# Patient Record
Sex: FEMALE | Race: WHITE | NOT HISPANIC OR LATINO | Employment: OTHER | URBAN - METROPOLITAN AREA
[De-identification: names, ages, dates, MRNs, and addresses within clinical notes are randomized per-mention and may not be internally consistent; named-entity substitution may affect disease eponyms.]

---

## 2018-06-26 ENCOUNTER — TRANSCRIBE ORDERS (OUTPATIENT)
Dept: ADMINISTRATIVE | Facility: HOSPITAL | Age: 53
End: 2018-06-26

## 2018-06-26 DIAGNOSIS — Z12.39 SCREENING BREAST EXAMINATION: Primary | ICD-10-CM

## 2018-07-09 ENCOUNTER — HOSPITAL ENCOUNTER (OUTPATIENT)
Dept: MAMMOGRAPHY | Facility: HOSPITAL | Age: 53
Discharge: HOME | End: 2018-07-09
Attending: OBSTETRICS & GYNECOLOGY | Admitting: OBSTETRICS & GYNECOLOGY

## 2018-07-09 DIAGNOSIS — Z12.39 SCREENING BREAST EXAMINATION: ICD-10-CM

## 2018-07-09 PROCEDURE — 77067 SCR MAMMO BI INCL CAD: CPT

## 2018-07-10 DIAGNOSIS — R92.8 ABNORMAL MAMMOGRAM: Primary | ICD-10-CM

## 2018-07-18 ENCOUNTER — TELEPHONE (OUTPATIENT)
Dept: OBSTETRICS AND GYNECOLOGY | Facility: CLINIC | Age: 53
End: 2018-07-18

## 2018-07-18 ENCOUNTER — HOSPITAL ENCOUNTER (OUTPATIENT)
Dept: MAMMOGRAPHY | Facility: HOSPITAL | Age: 53
Discharge: HOME | End: 2018-07-18
Attending: OBSTETRICS & GYNECOLOGY | Admitting: OBSTETRICS & GYNECOLOGY

## 2018-07-18 ENCOUNTER — HOSPITAL ENCOUNTER (OUTPATIENT)
Dept: ULTRASOUND IMAGING | Facility: HOSPITAL | Age: 53
Discharge: HOME | End: 2018-07-18
Attending: OBSTETRICS & GYNECOLOGY

## 2018-07-18 DIAGNOSIS — R92.8 ABNORMAL MAMMOGRAM: ICD-10-CM

## 2018-07-18 PROCEDURE — 77065 DX MAMMO INCL CAD UNI: CPT

## 2018-07-18 PROCEDURE — 76642 ULTRASOUND BREAST LIMITED: CPT

## 2018-07-18 NOTE — TELEPHONE ENCOUNTER
Gregory called and said that they recommend a bx on the right breast.  They said they saw a lesion.  Said report should be in imaging.

## 2018-07-19 DIAGNOSIS — R92.8 ABNORMAL MAMMOGRAM OF RIGHT BREAST: Primary | ICD-10-CM

## 2018-07-25 ENCOUNTER — HOSPITAL ENCOUNTER (OUTPATIENT)
Dept: MAMMOGRAPHY | Facility: HOSPITAL | Age: 53
Discharge: HOME | End: 2018-07-25
Attending: OBSTETRICS & GYNECOLOGY | Admitting: OBSTETRICS & GYNECOLOGY

## 2018-07-25 VITALS
OXYGEN SATURATION: 98 % | RESPIRATION RATE: 18 BRPM | HEART RATE: 75 BPM | BODY MASS INDEX: 30.53 KG/M2 | TEMPERATURE: 98.7 F | WEIGHT: 190 LBS | SYSTOLIC BLOOD PRESSURE: 141 MMHG | HEIGHT: 66 IN | DIASTOLIC BLOOD PRESSURE: 86 MMHG

## 2018-07-25 DIAGNOSIS — R92.8 ABNORMAL MAMMOGRAM OF RIGHT BREAST: ICD-10-CM

## 2018-07-25 PROCEDURE — 88305 TISSUE EXAM BY PATHOLOGIST: CPT | Performed by: OBSTETRICS & GYNECOLOGY

## 2018-07-25 RX ORDER — LIDOCAINE HYDROCHLORIDE 10 MG/ML
3 INJECTION, SOLUTION INFILTRATION; PERINEURAL ONCE
Status: COMPLETED | OUTPATIENT
Start: 2018-07-25 | End: 2018-07-25

## 2018-07-25 RX ORDER — LIDOCAINE HYDROCHLORIDE AND EPINEPHRINE 10; 10 MG/ML; UG/ML
20 INJECTION, SOLUTION INFILTRATION; PERINEURAL ONCE
Status: COMPLETED | OUTPATIENT
Start: 2018-07-25 | End: 2018-07-25

## 2018-07-25 RX ADMIN — LIDOCAINE HYDROCHLORIDE 3 ML: 10 INJECTION, SOLUTION INFILTRATION; PERINEURAL at 12:50

## 2018-07-25 RX ADMIN — LIDOCAINE HYDROCHLORIDE AND EPINEPHRINE 20 ML: 10; 10 INJECTION, SOLUTION INFILTRATION; PERINEURAL at 12:51

## 2018-07-25 NOTE — NURSING NOTE
Biopsy site to right medial breast clear with Dermabond dry and intact. No firmness or swelling noted at or around biopsy site. Denies pain. Ice pack with protective covering applied to biopsy site. Discharge instructions discussed with understanding voiced by patient. Copies provided to patient. No distress noted. To home via private vehicle.

## 2018-07-26 ENCOUNTER — TELEPHONE (OUTPATIENT)
Dept: INTERVENTIONAL RADIOLOGY/VASCULAR | Facility: HOSPITAL | Age: 53
End: 2018-07-26

## 2018-07-27 LAB
CYTO UR: NORMAL
LAB AP CASE REPORT: NORMAL
LAB AP CLINICAL INFORMATION: NORMAL
LAB AP INTRADEPARTMENTAL CONSULT: NORMAL
PATH REPORT.FINAL DX SPEC: NORMAL
PATH REPORT.GROSS SPEC: NORMAL

## 2018-07-30 ENCOUNTER — TELEPHONE (OUTPATIENT)
Dept: SURGERY | Facility: CLINIC | Age: 53
End: 2018-07-30

## 2018-07-30 DIAGNOSIS — N60.99 ATYPICAL HYPERPLASIA OF BREAST: Primary | ICD-10-CM

## 2018-07-30 DIAGNOSIS — R89.7 ABNORMAL BREAST BIOPSY: Primary | ICD-10-CM

## 2018-07-30 NOTE — PROGRESS NOTES
I spoke to pt by phone and explained that she has 2 lesions that need further evaluation by a breast surgeon. I would like her referred to Dr. Roma Hernandez and she is agreeable. Please arrange referral. Orders placed in EPIC

## 2018-07-30 NOTE — TELEPHONE ENCOUNTER
Message sent to Katy in Mammo Dept.   Needing to ask Dr. Sky to give us a measurement on the calcs seen on mammo 7-9-18    lml

## 2018-08-06 ENCOUNTER — HOSPITAL ENCOUNTER (OUTPATIENT)
Dept: MRI IMAGING | Facility: HOSPITAL | Age: 53
Discharge: HOME | End: 2018-08-06
Attending: SURGERY | Admitting: SURGERY

## 2018-08-06 DIAGNOSIS — N60.99 ATYPICAL HYPERPLASIA OF BREAST: ICD-10-CM

## 2018-08-06 LAB — CREAT BLDA-MCNC: 0.6 MG/DL (ref 0.6–1.3)

## 2018-08-06 PROCEDURE — 82565 ASSAY OF CREATININE: CPT

## 2018-08-06 PROCEDURE — 0159T HC MRI BREAST COMPUTER ANALYSIS: CPT

## 2018-08-06 PROCEDURE — A9577 INJ MULTIHANCE: HCPCS | Performed by: SURGERY

## 2018-08-06 PROCEDURE — 0 GADOBENATE DIMEGLUMINE 529 MG/ML SOLUTION: Performed by: SURGERY

## 2018-08-06 PROCEDURE — C8908 MRI W/O FOL W/CONT, BREAST,: HCPCS

## 2018-08-06 RX ADMIN — GADOBENATE DIMEGLUMINE 17 ML: 529 INJECTION, SOLUTION INTRAVENOUS at 17:00

## 2018-08-16 ENCOUNTER — PREP FOR SURGERY (OUTPATIENT)
Dept: OTHER | Facility: HOSPITAL | Age: 53
End: 2018-08-16

## 2018-08-16 ENCOUNTER — OFFICE VISIT (OUTPATIENT)
Dept: SURGERY | Facility: CLINIC | Age: 53
End: 2018-08-16

## 2018-08-16 VITALS
HEIGHT: 66 IN | DIASTOLIC BLOOD PRESSURE: 97 MMHG | OXYGEN SATURATION: 97 % | SYSTOLIC BLOOD PRESSURE: 149 MMHG | BODY MASS INDEX: 31.57 KG/M2 | WEIGHT: 196.4 LBS | HEART RATE: 84 BPM

## 2018-08-16 DIAGNOSIS — Z80.3 FH: BREAST CANCER: ICD-10-CM

## 2018-08-16 DIAGNOSIS — D05.11 DUCTAL CARCINOMA IN SITU (DCIS) OF RIGHT BREAST: ICD-10-CM

## 2018-08-16 DIAGNOSIS — N60.99 ATYPICAL HYPERPLASIA OF BREAST: Primary | ICD-10-CM

## 2018-08-16 DIAGNOSIS — R92.8 ABNORMAL MRI, BREAST: ICD-10-CM

## 2018-08-16 DIAGNOSIS — D05.11 BREAST NEOPLASM, TIS (DCIS), RIGHT: Primary | ICD-10-CM

## 2018-08-16 DIAGNOSIS — IMO0001: ICD-10-CM

## 2018-08-16 DIAGNOSIS — R92.1 BREAST CALCIFICATIONS ON MAMMOGRAM: ICD-10-CM

## 2018-08-16 PROCEDURE — 99244 OFF/OP CNSLTJ NEW/EST MOD 40: CPT | Performed by: SURGERY

## 2018-08-16 RX ORDER — SODIUM CHLORIDE, SODIUM LACTATE, POTASSIUM CHLORIDE, CALCIUM CHLORIDE 600; 310; 30; 20 MG/100ML; MG/100ML; MG/100ML; MG/100ML
100 INJECTION, SOLUTION INTRAVENOUS CONTINUOUS
Status: CANCELLED | OUTPATIENT
Start: 2018-08-16

## 2018-08-16 RX ORDER — CEFAZOLIN SODIUM 2 G/100ML
2 INJECTION, SOLUTION INTRAVENOUS ONCE
Status: CANCELLED | OUTPATIENT
Start: 2018-08-16 | End: 2018-08-16

## 2018-08-16 RX ORDER — DIAZEPAM 5 MG/1
10 TABLET ORAL ONCE
Status: CANCELLED | OUTPATIENT
Start: 2018-08-16 | End: 2018-08-16

## 2018-08-17 ENCOUNTER — TELEPHONE (OUTPATIENT)
Dept: SURGERY | Facility: CLINIC | Age: 53
End: 2018-08-17

## 2018-08-17 ENCOUNTER — TRANSCRIBE ORDERS (OUTPATIENT)
Dept: SURGERY | Facility: CLINIC | Age: 53
End: 2018-08-17

## 2018-08-17 DIAGNOSIS — D05.11 DUCTAL CARCINOMA IN SITU OF RIGHT BREAST: Primary | ICD-10-CM

## 2018-08-17 NOTE — TELEPHONE ENCOUNTER
Scheduled Consult with Dr Rey  8-30-18 arrive 1:15  [address] [facility]  [address]  [place] [address]  [phone]    Scheduled Surgery Main OR 9-19-18    Right Breast Needle Localized Lumpectomy, Oncoplastic  Closure, Possible Nipple Loss.  Dr Rey          Arrive        9:30  Nl            11:30  Surgery     1:00    PAT 9-10-18  @ 1:00    Return to see Dr KING  10 -2-18 Arrive 12:15    LM for pt to call:    marcella    Pt called and v/u with appointments  marcella

## 2018-08-27 ENCOUNTER — TELEPHONE (OUTPATIENT)
Dept: SURGERY | Facility: CLINIC | Age: 53
End: 2018-08-27

## 2018-08-28 ENCOUNTER — TELEPHONE (OUTPATIENT)
Dept: SURGERY | Facility: CLINIC | Age: 53
End: 2018-08-28

## 2018-08-28 NOTE — TELEPHONE ENCOUNTER
I called to let patient know genetic test are negative.   Confirmed with patient and shared met genetic counseling information.   lynn

## 2018-09-10 ENCOUNTER — APPOINTMENT (OUTPATIENT)
Dept: PREADMISSION TESTING | Facility: HOSPITAL | Age: 53
End: 2018-09-10

## 2018-09-10 VITALS
TEMPERATURE: 98.2 F | HEART RATE: 72 BPM | RESPIRATION RATE: 20 BRPM | BODY MASS INDEX: 31.5 KG/M2 | HEIGHT: 66 IN | OXYGEN SATURATION: 97 % | SYSTOLIC BLOOD PRESSURE: 135 MMHG | DIASTOLIC BLOOD PRESSURE: 82 MMHG | WEIGHT: 196 LBS

## 2018-09-10 LAB
DEPRECATED RDW RBC AUTO: 40 FL (ref 37–54)
ERYTHROCYTE [DISTWIDTH] IN BLOOD BY AUTOMATED COUNT: 12.7 % (ref 11.7–13)
HCT VFR BLD AUTO: 43.2 % (ref 35.6–45.5)
HGB BLD-MCNC: 13.9 G/DL (ref 11.9–15.5)
MCH RBC QN AUTO: 27.9 PG (ref 26.9–32)
MCHC RBC AUTO-ENTMCNC: 32.2 G/DL (ref 32.4–36.3)
MCV RBC AUTO: 86.7 FL (ref 80.5–98.2)
PLATELET # BLD AUTO: 237 10*3/MM3 (ref 140–500)
PMV BLD AUTO: 11.4 FL (ref 6–12)
RBC # BLD AUTO: 4.98 10*6/MM3 (ref 3.9–5.2)
WBC NRBC COR # BLD: 7.55 10*3/MM3 (ref 4.5–10.7)

## 2018-09-10 PROCEDURE — 36415 COLL VENOUS BLD VENIPUNCTURE: CPT

## 2018-09-10 PROCEDURE — 85027 COMPLETE CBC AUTOMATED: CPT | Performed by: SURGERY

## 2018-09-10 NOTE — DISCHARGE INSTRUCTIONS
Take the following medications the morning of surgery with a small sip of water:  NONE      General Instructions:  • Do not eat solid food after midnight the night before surgery.  • You may drink clear liquids day of surgery but must stop at least one hour before your hospital arrival time.  • It is beneficial for you to have a clear drink that contains carbohydrates the day of surgery.  We suggest a 12 to 20 ounce bottle of Gatorade or Powerade for non-diabetic patients or a 12 to 20 ounce bottle of G2 or Powerade Zero for diabetic patients. (Pediatric patients, are not advised to drink a 12 to 20 ounce carbohydrate drink)    Clear liquids are liquids you can see through.  Nothing red in color.     Plain water                               Sports drinks  Sodas                                   Gelatin (Jell-O)  Fruit juices without pulp such as white grape juice and apple juice  Popsicles that contain no fruit or yogurt  Tea or coffee (no cream or milk added)  Gatorade / Powerade  G2 / Powerade Zero    • Infants may have breast milk up to four hours before surgery.  • Infants drinking formula may drink formula up to six hours before surgery.   • Patients who avoid smoking, chewing tobacco and alcohol for 4 weeks prior to surgery have a reduced risk of post-operative complications.  Quit smoking as many days before surgery as you can.  • Do not smoke, use chewing tobacco or drink alcohol the day of surgery.   • If applicable bring your C-PAP/ BI-PAP machine.  • Bring any papers given to you in the doctor’s office.  • Wear clean comfortable clothes and socks.  • Do not wear contact lenses or make-up.  Bring a case for your glasses.   • Bring crutches or walker if applicable.  • Remove all piercings.  Leave jewelry and any other valuables at home.  • Hair extensions with metal clips must be removed prior to surgery.  • The Pre-Admission Testing nurse will instruct you to bring medications if unable to obtain an  accurate list in Pre-Admission Testing.        If you were given a blood bank ID arm band remember to bring it with you the day of surgery.    Preventing a Surgical Site Infection:  • For 2 to 3 days before surgery, avoid shaving with a razor because the razor can irritate skin and make it easier to develop an infection.    • Any areas of open skin can increase the risk of a post-operative wound infection by allowing bacteria to enter and travel throughout the body.  Notify your surgeon if you have any skin wounds / rashes even if it is not near the expected surgical site.  The area will need assessed to determine if surgery should be delayed until it is healed.  • The night prior to surgery sleep in a clean bed with clean clothing.  Do not allow pets to sleep with you.  • Shower on the morning of surgery using a fresh bar of anti-bacterial soap (such as Dial) and clean washcloth.  Dry with a clean towel and dress in clean clothing.  • Ask your surgeon if you will be receiving antibiotics prior to surgery.  • Make sure you, your family, and all healthcare providers clean their hands with soap and water or an alcohol based hand  before caring for you or your wound.    Day of surgery: 9/19/2018 ARRIVAL TIME 9:30 AM  Upon arrival, a Pre-op nurse and Anesthesiologist will review your health history, obtain vital signs, and answer questions you may have.  The only belongings needed at this time will be your home medications and if applicable your C-PAP/BI-PAP machine.  If you are staying overnight your family can leave the rest of your belongings in the car and bring them to your room later.  A Pre-op nurse will start an IV and you may receive medication in preparation for surgery, including something to help you relax.  Your family will be able to see you in the Pre-op area.  While you are in surgery your family should notify the waiting room  if they leave the waiting room area and provide a  contact phone number.    Please be aware that surgery does come with discomfort.  We want to make every effort to control your discomfort so please discuss any uncontrolled symptoms with your nurse.   Your doctor will most likely have prescribed pain medications.      If you are going home after surgery you will receive individualized written care instructions before being discharged.  A responsible adult must drive you to and from the hospital on the day of your surgery and stay with you for 24 hours.    If you are staying overnight following surgery, you will be transported to your hospital room following the recovery period.  [facility] has all private rooms.    You have received a list of surgical assistants for your reference.  If you have any questions please call Pre-Admission Testing at [phone].  Deductibles and co-payments are collected on the day of service. Please be prepared to pay the required co-pay, deductible or deposit on the day of service as defined by your plan.

## 2018-09-14 ENCOUNTER — TELEPHONE (OUTPATIENT)
Dept: SURGERY | Facility: CLINIC | Age: 53
End: 2018-09-14

## 2018-09-14 NOTE — TELEPHONE ENCOUNTER
Note from Dr. Yarelis Rey dated August 30, 2018: Right lumpectomy that is in a difficult location near the nipple areolar complex, medial 3:00, superficial.  Discussed possible need for removal or partial removal nipple areolar complex depending on underlying supportive tissue.  Plan for right breast PLASTIC closure and left breast reduction mastopexy for symmetry.

## 2018-09-19 ENCOUNTER — ANESTHESIA EVENT (OUTPATIENT)
Dept: PERIOP | Facility: HOSPITAL | Age: 53
End: 2018-09-19

## 2018-09-19 ENCOUNTER — APPOINTMENT (OUTPATIENT)
Dept: GENERAL RADIOLOGY | Facility: HOSPITAL | Age: 53
End: 2018-09-19

## 2018-09-19 ENCOUNTER — HOSPITAL ENCOUNTER (OUTPATIENT)
Dept: MAMMOGRAPHY | Facility: HOSPITAL | Age: 53
Discharge: HOME | End: 2018-09-19
Attending: SURGERY

## 2018-09-19 ENCOUNTER — HOSPITAL ENCOUNTER (OUTPATIENT)
Facility: HOSPITAL | Age: 53
Setting detail: HOSPITAL OUTPATIENT SURGERY
Discharge: HOME | End: 2018-09-20
Attending: SURGERY | Admitting: SURGERY

## 2018-09-19 ENCOUNTER — ANESTHESIA (OUTPATIENT)
Dept: PERIOP | Facility: HOSPITAL | Age: 53
End: 2018-09-19

## 2018-09-19 DIAGNOSIS — D05.11 DUCTAL CARCINOMA IN SITU OF RIGHT BREAST: ICD-10-CM

## 2018-09-19 DIAGNOSIS — D05.11 BREAST NEOPLASM, TIS (DCIS), RIGHT: ICD-10-CM

## 2018-09-19 LAB
B-HCG UR QL: NEGATIVE
INTERNAL NEGATIVE CONTROL: NEGATIVE
INTERNAL POSITIVE CONTROL: POSITIVE
Lab: NORMAL

## 2018-09-19 PROCEDURE — 88305 TISSUE EXAM BY PATHOLOGIST: CPT | Performed by: SURGERY

## 2018-09-19 PROCEDURE — 76098 X-RAY EXAM SURGICAL SPECIMEN: CPT

## 2018-09-19 PROCEDURE — 25010000002 ONDANSETRON PER 1 MG: Performed by: NURSE ANESTHETIST, CERTIFIED REGISTERED

## 2018-09-19 PROCEDURE — 25010000002 FENTANYL CITRATE (PF) 100 MCG/2ML SOLUTION: Performed by: NURSE ANESTHETIST, CERTIFIED REGISTERED

## 2018-09-19 PROCEDURE — 25010000002 HYDROMORPHONE PER 4 MG

## 2018-09-19 PROCEDURE — 81025 URINE PREGNANCY TEST: CPT | Performed by: ANESTHESIOLOGY

## 2018-09-19 PROCEDURE — 25010000002 DEXAMETHASONE PER 1 MG: Performed by: NURSE ANESTHETIST, CERTIFIED REGISTERED

## 2018-09-19 PROCEDURE — 19301 PARTIAL MASTECTOMY: CPT | Performed by: SURGERY

## 2018-09-19 PROCEDURE — 25010000002 HYDROMORPHONE PER 4 MG: Performed by: NURSE ANESTHETIST, CERTIFIED REGISTERED

## 2018-09-19 PROCEDURE — 25010000002 NEOSTIGMINE 5 MG/10ML SOLUTION: Performed by: ANESTHESIOLOGY

## 2018-09-19 PROCEDURE — 88307 TISSUE EXAM BY PATHOLOGIST: CPT | Performed by: SURGERY

## 2018-09-19 PROCEDURE — 25010000002 PROPOFOL 10 MG/ML EMULSION: Performed by: NURSE ANESTHETIST, CERTIFIED REGISTERED

## 2018-09-19 PROCEDURE — 25010000003 CEFAZOLIN IN DEXTROSE 2-4 GM/100ML-% SOLUTION: Performed by: SURGERY

## 2018-09-19 RX ORDER — ROCURONIUM BROMIDE 10 MG/ML
INJECTION, SOLUTION INTRAVENOUS AS NEEDED
Status: DISCONTINUED | OUTPATIENT
Start: 2018-09-19 | End: 2018-09-19 | Stop reason: SURG

## 2018-09-19 RX ORDER — PROMETHAZINE HYDROCHLORIDE 25 MG/1
25 SUPPOSITORY RECTAL ONCE AS NEEDED
Status: DISCONTINUED | OUTPATIENT
Start: 2018-09-19 | End: 2018-09-19 | Stop reason: HOSPADM

## 2018-09-19 RX ORDER — LIDOCAINE HYDROCHLORIDE 10 MG/ML
3 INJECTION, SOLUTION INFILTRATION; PERINEURAL ONCE
Status: COMPLETED | OUTPATIENT
Start: 2018-09-19 | End: 2018-09-19

## 2018-09-19 RX ORDER — FENTANYL CITRATE 50 UG/ML
INJECTION, SOLUTION INTRAMUSCULAR; INTRAVENOUS AS NEEDED
Status: DISCONTINUED | OUTPATIENT
Start: 2018-09-19 | End: 2018-09-19 | Stop reason: SURG

## 2018-09-19 RX ORDER — DIAZEPAM 5 MG/1
10 TABLET ORAL ONCE
Status: COMPLETED | OUTPATIENT
Start: 2018-09-19 | End: 2018-09-19

## 2018-09-19 RX ORDER — EPHEDRINE SULFATE 50 MG/ML
5 INJECTION, SOLUTION INTRAVENOUS ONCE AS NEEDED
Status: DISCONTINUED | OUTPATIENT
Start: 2018-09-19 | End: 2018-09-19 | Stop reason: HOSPADM

## 2018-09-19 RX ORDER — ONDANSETRON 2 MG/ML
INJECTION INTRAMUSCULAR; INTRAVENOUS AS NEEDED
Status: DISCONTINUED | OUTPATIENT
Start: 2018-09-19 | End: 2018-09-19 | Stop reason: SURG

## 2018-09-19 RX ORDER — PROMETHAZINE HYDROCHLORIDE 25 MG/1
25 TABLET ORAL ONCE AS NEEDED
Status: DISCONTINUED | OUTPATIENT
Start: 2018-09-19 | End: 2018-09-19 | Stop reason: HOSPADM

## 2018-09-19 RX ORDER — CEPHALEXIN 500 MG/1
500 CAPSULE ORAL EVERY 6 HOURS SCHEDULED
Status: DISCONTINUED | OUTPATIENT
Start: 2018-09-20 | End: 2018-09-20 | Stop reason: HOSPADM

## 2018-09-19 RX ORDER — FAMOTIDINE 10 MG/ML
20 INJECTION, SOLUTION INTRAVENOUS ONCE
Status: COMPLETED | OUTPATIENT
Start: 2018-09-19 | End: 2018-09-19

## 2018-09-19 RX ORDER — HYDROCODONE BITARTRATE AND ACETAMINOPHEN 7.5; 325 MG/1; MG/1
2 TABLET ORAL EVERY 4 HOURS PRN
Status: DISCONTINUED | OUTPATIENT
Start: 2018-09-19 | End: 2018-09-20 | Stop reason: HOSPADM

## 2018-09-19 RX ORDER — FENTANYL CITRATE 50 UG/ML
50 INJECTION, SOLUTION INTRAMUSCULAR; INTRAVENOUS
Status: DISCONTINUED | OUTPATIENT
Start: 2018-09-19 | End: 2018-09-19 | Stop reason: HOSPADM

## 2018-09-19 RX ORDER — SODIUM CHLORIDE 0.9 % (FLUSH) 0.9 %
1-10 SYRINGE (ML) INJECTION AS NEEDED
Status: DISCONTINUED | OUTPATIENT
Start: 2018-09-19 | End: 2018-09-19 | Stop reason: HOSPADM

## 2018-09-19 RX ORDER — PROMETHAZINE HYDROCHLORIDE 25 MG/ML
12.5 INJECTION, SOLUTION INTRAMUSCULAR; INTRAVENOUS ONCE AS NEEDED
Status: DISCONTINUED | OUTPATIENT
Start: 2018-09-19 | End: 2018-09-19 | Stop reason: HOSPADM

## 2018-09-19 RX ORDER — PROPOFOL 10 MG/ML
VIAL (ML) INTRAVENOUS AS NEEDED
Status: DISCONTINUED | OUTPATIENT
Start: 2018-09-19 | End: 2018-09-19 | Stop reason: SURG

## 2018-09-19 RX ORDER — NEOMYCIN AND POLYMYXIN B SULFATES 40; 200000 MG/ML; [USP'U]/ML
SOLUTION IRRIGATION AS NEEDED
Status: DISCONTINUED | OUTPATIENT
Start: 2018-09-19 | End: 2018-09-19 | Stop reason: HOSPADM

## 2018-09-19 RX ORDER — HYDROMORPHONE HCL 110MG/55ML
PATIENT CONTROLLED ANALGESIA SYRINGE INTRAVENOUS AS NEEDED
Status: DISCONTINUED | OUTPATIENT
Start: 2018-09-19 | End: 2018-09-19 | Stop reason: SURG

## 2018-09-19 RX ORDER — DIPHENHYDRAMINE HYDROCHLORIDE 50 MG/ML
12.5 INJECTION INTRAMUSCULAR; INTRAVENOUS
Status: DISCONTINUED | OUTPATIENT
Start: 2018-09-19 | End: 2018-09-19 | Stop reason: HOSPADM

## 2018-09-19 RX ORDER — CEFAZOLIN SODIUM 2 G/100ML
2 INJECTION, SOLUTION INTRAVENOUS ONCE
Status: COMPLETED | OUTPATIENT
Start: 2018-09-19 | End: 2018-09-19

## 2018-09-19 RX ORDER — PROMETHAZINE HYDROCHLORIDE 25 MG/1
12.5 TABLET ORAL ONCE AS NEEDED
Status: DISCONTINUED | OUTPATIENT
Start: 2018-09-19 | End: 2018-09-19 | Stop reason: HOSPADM

## 2018-09-19 RX ORDER — PROMETHAZINE HYDROCHLORIDE 25 MG/1
25 TABLET ORAL EVERY 8 HOURS PRN
Status: DISCONTINUED | OUTPATIENT
Start: 2018-09-19 | End: 2018-09-20 | Stop reason: HOSPADM

## 2018-09-19 RX ORDER — ONDANSETRON 2 MG/ML
4 INJECTION INTRAMUSCULAR; INTRAVENOUS ONCE AS NEEDED
Status: DISCONTINUED | OUTPATIENT
Start: 2018-09-19 | End: 2018-09-19 | Stop reason: HOSPADM

## 2018-09-19 RX ORDER — LIDOCAINE HYDROCHLORIDE 20 MG/ML
INJECTION, SOLUTION INFILTRATION; PERINEURAL AS NEEDED
Status: DISCONTINUED | OUTPATIENT
Start: 2018-09-19 | End: 2018-09-19 | Stop reason: SURG

## 2018-09-19 RX ORDER — DEXAMETHASONE SODIUM PHOSPHATE 10 MG/ML
INJECTION INTRAMUSCULAR; INTRAVENOUS AS NEEDED
Status: DISCONTINUED | OUTPATIENT
Start: 2018-09-19 | End: 2018-09-19 | Stop reason: SURG

## 2018-09-19 RX ORDER — FLUMAZENIL 0.1 MG/ML
0.2 INJECTION INTRAVENOUS AS NEEDED
Status: DISCONTINUED | OUTPATIENT
Start: 2018-09-19 | End: 2018-09-19 | Stop reason: HOSPADM

## 2018-09-19 RX ORDER — BUPIVACAINE HYDROCHLORIDE 2.5 MG/ML
INJECTION, SOLUTION EPIDURAL; INFILTRATION; INTRACAUDAL AS NEEDED
Status: DISCONTINUED | OUTPATIENT
Start: 2018-09-19 | End: 2018-09-19 | Stop reason: HOSPADM

## 2018-09-19 RX ORDER — SODIUM CHLORIDE, SODIUM LACTATE, POTASSIUM CHLORIDE, CALCIUM CHLORIDE 600; 310; 30; 20 MG/100ML; MG/100ML; MG/100ML; MG/100ML
9 INJECTION, SOLUTION INTRAVENOUS CONTINUOUS
Status: DISCONTINUED | OUTPATIENT
Start: 2018-09-19 | End: 2018-09-20 | Stop reason: HOSPADM

## 2018-09-19 RX ORDER — MIDAZOLAM HYDROCHLORIDE 1 MG/ML
1 INJECTION INTRAMUSCULAR; INTRAVENOUS
Status: DISCONTINUED | OUTPATIENT
Start: 2018-09-19 | End: 2018-09-19 | Stop reason: HOSPADM

## 2018-09-19 RX ORDER — ACETAMINOPHEN 500 MG
1000 TABLET ORAL EVERY 6 HOURS PRN
COMMUNITY
End: 2019-10-28

## 2018-09-19 RX ORDER — LABETALOL HYDROCHLORIDE 5 MG/ML
5 INJECTION, SOLUTION INTRAVENOUS
Status: DISCONTINUED | OUTPATIENT
Start: 2018-09-19 | End: 2018-09-19 | Stop reason: HOSPADM

## 2018-09-19 RX ORDER — SCOLOPAMINE TRANSDERMAL SYSTEM 1 MG/1
1 PATCH, EXTENDED RELEASE TRANSDERMAL ONCE
Status: COMPLETED | OUTPATIENT
Start: 2018-09-19 | End: 2018-09-20

## 2018-09-19 RX ORDER — LIDOCAINE HYDROCHLORIDE 10 MG/ML
0.5 INJECTION, SOLUTION EPIDURAL; INFILTRATION; INTRACAUDAL; PERINEURAL ONCE AS NEEDED
Status: DISCONTINUED | OUTPATIENT
Start: 2018-09-19 | End: 2018-09-19 | Stop reason: HOSPADM

## 2018-09-19 RX ORDER — GLYCOPYRROLATE 0.2 MG/ML
INJECTION INTRAMUSCULAR; INTRAVENOUS AS NEEDED
Status: DISCONTINUED | OUTPATIENT
Start: 2018-09-19 | End: 2018-09-19 | Stop reason: SURG

## 2018-09-19 RX ORDER — SODIUM CHLORIDE, SODIUM LACTATE, POTASSIUM CHLORIDE, CALCIUM CHLORIDE 600; 310; 30; 20 MG/100ML; MG/100ML; MG/100ML; MG/100ML
75 INJECTION, SOLUTION INTRAVENOUS CONTINUOUS
Status: DISCONTINUED | OUTPATIENT
Start: 2018-09-19 | End: 2018-09-20 | Stop reason: HOSPADM

## 2018-09-19 RX ORDER — NEOSTIGMINE METHYLSULFATE 0.5 MG/ML
INJECTION, SOLUTION INTRAVENOUS AS NEEDED
Status: DISCONTINUED | OUTPATIENT
Start: 2018-09-19 | End: 2018-09-19 | Stop reason: SURG

## 2018-09-19 RX ORDER — SODIUM CHLORIDE, SODIUM LACTATE, POTASSIUM CHLORIDE, CALCIUM CHLORIDE 600; 310; 30; 20 MG/100ML; MG/100ML; MG/100ML; MG/100ML
100 INJECTION, SOLUTION INTRAVENOUS CONTINUOUS
Status: DISCONTINUED | OUTPATIENT
Start: 2018-09-19 | End: 2018-09-20 | Stop reason: HOSPADM

## 2018-09-19 RX ORDER — OXYCODONE AND ACETAMINOPHEN 7.5; 325 MG/1; MG/1
1 TABLET ORAL ONCE AS NEEDED
Status: COMPLETED | OUTPATIENT
Start: 2018-09-19 | End: 2018-09-19

## 2018-09-19 RX ORDER — MORPHINE SULFATE 2 MG/ML
2 INJECTION, SOLUTION INTRAMUSCULAR; INTRAVENOUS
Status: DISCONTINUED | OUTPATIENT
Start: 2018-09-19 | End: 2018-09-20 | Stop reason: HOSPADM

## 2018-09-19 RX ORDER — MIDAZOLAM HYDROCHLORIDE 1 MG/ML
2 INJECTION INTRAMUSCULAR; INTRAVENOUS
Status: DISCONTINUED | OUTPATIENT
Start: 2018-09-19 | End: 2018-09-19 | Stop reason: HOSPADM

## 2018-09-19 RX ORDER — NALOXONE HCL 0.4 MG/ML
0.2 VIAL (ML) INJECTION AS NEEDED
Status: DISCONTINUED | OUTPATIENT
Start: 2018-09-19 | End: 2018-09-19 | Stop reason: HOSPADM

## 2018-09-19 RX ORDER — HYDROCODONE BITARTRATE AND ACETAMINOPHEN 7.5; 325 MG/1; MG/1
1 TABLET ORAL ONCE AS NEEDED
Status: DISCONTINUED | OUTPATIENT
Start: 2018-09-19 | End: 2018-09-19 | Stop reason: HOSPADM

## 2018-09-19 RX ADMIN — FENTANYL CITRATE 100 MCG: 50 INJECTION, SOLUTION INTRAMUSCULAR; INTRAVENOUS at 14:35

## 2018-09-19 RX ADMIN — HYDROMORPHONE HYDROCHLORIDE 0.5 MG: 2 INJECTION INTRAMUSCULAR; INTRAVENOUS; SUBCUTANEOUS at 15:50

## 2018-09-19 RX ADMIN — HYDROMORPHONE HYDROCHLORIDE 0.5 MG: 2 INJECTION INTRAMUSCULAR; INTRAVENOUS; SUBCUTANEOUS at 17:10

## 2018-09-19 RX ADMIN — PROPOFOL 150 MG: 10 INJECTION, EMULSION INTRAVENOUS at 13:36

## 2018-09-19 RX ADMIN — FENTANYL CITRATE 100 MCG: 50 INJECTION, SOLUTION INTRAMUSCULAR; INTRAVENOUS at 13:36

## 2018-09-19 RX ADMIN — SODIUM CHLORIDE, POTASSIUM CHLORIDE, SODIUM LACTATE AND CALCIUM CHLORIDE 75 ML/HR: 600; 310; 30; 20 INJECTION, SOLUTION INTRAVENOUS at 23:35

## 2018-09-19 RX ADMIN — ONDANSETRON 4 MG: 2 INJECTION INTRAMUSCULAR; INTRAVENOUS at 17:09

## 2018-09-19 RX ADMIN — SODIUM CHLORIDE, POTASSIUM CHLORIDE, SODIUM LACTATE AND CALCIUM CHLORIDE: 600; 310; 30; 20 INJECTION, SOLUTION INTRAVENOUS at 16:20

## 2018-09-19 RX ADMIN — SCOPALAMINE 1 PATCH: 1 PATCH, EXTENDED RELEASE TRANSDERMAL at 10:27

## 2018-09-19 RX ADMIN — HYDROMORPHONE HYDROCHLORIDE 0.5 MG: 1 INJECTION, SOLUTION INTRAMUSCULAR; INTRAVENOUS; SUBCUTANEOUS at 18:30

## 2018-09-19 RX ADMIN — SODIUM CHLORIDE, POTASSIUM CHLORIDE, SODIUM LACTATE AND CALCIUM CHLORIDE 100 ML/HR: 600; 310; 30; 20 INJECTION, SOLUTION INTRAVENOUS at 10:27

## 2018-09-19 RX ADMIN — FENTANYL CITRATE 50 MCG: 50 INJECTION, SOLUTION INTRAMUSCULAR; INTRAVENOUS at 18:43

## 2018-09-19 RX ADMIN — DEXAMETHASONE SODIUM PHOSPHATE 6 MG: 10 INJECTION INTRAMUSCULAR; INTRAVENOUS at 14:00

## 2018-09-19 RX ADMIN — HYDROCODONE BITARTRATE AND ACETAMINOPHEN 2 TABLET: 7.5; 325 TABLET ORAL at 22:00

## 2018-09-19 RX ADMIN — LIDOCAINE HYDROCHLORIDE 60 MG: 20 INJECTION, SOLUTION INFILTRATION; PERINEURAL at 13:36

## 2018-09-19 RX ADMIN — PROPOFOL 50 MG: 10 INJECTION, EMULSION INTRAVENOUS at 14:04

## 2018-09-19 RX ADMIN — ROCURONIUM BROMIDE 50 MG: 10 INJECTION INTRAVENOUS at 13:36

## 2018-09-19 RX ADMIN — CEFAZOLIN SODIUM 2 G: 2 INJECTION, SOLUTION INTRAVENOUS at 13:45

## 2018-09-19 RX ADMIN — FAMOTIDINE 20 MG: 10 INJECTION, SOLUTION INTRAVENOUS at 10:27

## 2018-09-19 RX ADMIN — LIDOCAINE HYDROCHLORIDE 3 ML: 10 INJECTION, SOLUTION INFILTRATION; PERINEURAL at 11:40

## 2018-09-19 RX ADMIN — Medication 0.5 MG: at 18:30

## 2018-09-19 RX ADMIN — NEOSTIGMINE METHYLSULFATE 2 MG: 0.5 INJECTION, SOLUTION INTRAVENOUS at 17:39

## 2018-09-19 RX ADMIN — GLYCOPYRROLATE 0.2 MG: 0.2 INJECTION INTRAMUSCULAR; INTRAVENOUS at 17:39

## 2018-09-19 RX ADMIN — FENTANYL CITRATE 50 MCG: 50 INJECTION, SOLUTION INTRAMUSCULAR; INTRAVENOUS at 14:04

## 2018-09-19 RX ADMIN — OXYCODONE HYDROCHLORIDE AND ACETAMINOPHEN 1 TABLET: 7.5; 325 TABLET ORAL at 18:48

## 2018-09-19 RX ADMIN — PROPOFOL 50 MG: 10 INJECTION, EMULSION INTRAVENOUS at 14:35

## 2018-09-19 RX ADMIN — CEPHALEXIN 500 MG: 500 CAPSULE ORAL at 23:35

## 2018-09-19 RX ADMIN — DIAZEPAM 10 MG: 5 TABLET ORAL at 10:18

## 2018-09-19 RX ADMIN — FENTANYL CITRATE 50 MCG: 50 INJECTION, SOLUTION INTRAMUSCULAR; INTRAVENOUS at 18:37

## 2018-09-19 NOTE — ANESTHESIA POSTPROCEDURE EVALUATION
"Patient: Mateusz Jorgensen    Procedure Summary     Date:  09/19/18 Room / Location:  [facility] OR [address] [place] [address] MAIN OR    Anesthesia Start:  1332 Anesthesia Stop:  1802    Procedures:       RIGHT needle localized lumpectomy, followed by oncoplastic closure and possible contralateral Mastopexy for symmetry  (Right Breast)      RIGHT BREAST ONCOPLASTIC CLOSURE left breast reduction (Right Chest) Diagnosis:       Breast neoplasm, Tis (DCIS), right      (Breast neoplasm, Tis (DCIS), right [D05.11])    Surgeon:  Roma Hernandez MD; Yarelis Rey MD Provider:  Mukesh Allen MD    Anesthesia Type:  general ASA Status:  2          Anesthesia Type: general  Last vitals  BP   (!) 152/103 (09/19/18 1830)   Temp   36.4 °C (97.5 °F) (09/19/18 1756)   Pulse   82 (09/19/18 1830)   Resp   16 (09/19/18 1830)     SpO2   98 % (09/19/18 1830)     Post Anesthesia Care and Evaluation    Patient location during evaluation: bedside  Patient participation: complete - patient participated  Level of consciousness: awake and alert  Pain management: adequate  Airway patency: patent  Anesthetic complications: No anesthetic complications    Cardiovascular status: acceptable  Respiratory status: acceptable  Hydration status: acceptable    Comments: BP (!) 152/103   Pulse 82   Temp 36.4 °C (97.5 °F) (Oral)   Resp 16   Ht 167.6 cm (66\")   Wt 89.6 kg (197 lb 7 oz)   SpO2 98%   BMI 31.87 kg/m²       "

## 2018-09-19 NOTE — OP NOTE
Operative note    Preoperative Diagnosis: Atypical Hyperplasia, Ductal, papilloma    Postoperative Diagnosis: same     Procedure Performed: right needle localized lumpectomy with oncoplastic closure to follow,  followed by a contralateral mastopexy..    Dictating physician and surgeon: Roma Hernandez MD    Plastic Surgeon: Yarelis Rey MD    EBL:2cc    FINDINGS AND DESCRIPTION OF PROCEDURE:     The patient was brought to the operating room and placed on the table in the supine position. After adequate general ETT anesthesia was obtained, we sterilly prepped and draped the breast and axilla. We did a time out to identify correct patient and correct operative site.  She did receive IV antibiotic within an hour of and prior to incision.     I used the intraoperative localization images to examine the lesion of interest.  I marked the intended area for resection and planned my incision based on both the  mammographic localization imaging as well as the preoperative reduction pattern incisions that Dr Rey micaela.. The localization need was the following length: 3cm. I took a vertical ellipse of skin medial to the intended new NAC, at the insertion  Site of the short needle.      I used a 15 blade to incise the skin. I then dissected using bovie electrocautery superiorly, inferiorly, medially and laterally around the lesion.  I examined the specimen using the intraoperative faxitron to document the presence of the lesion within the specimen. THe calcifications were seen centrally right at the needle. I then took the following additional margins : superior, medial, inferior, lateral, anterior, posterior.     I then passed the specimens and labelled as follows: For the lumpectomy,  long stitch lateral short stitch superior, double stitch deep. For the margins, stitch marks true margin.    I then irrigated, assured hemostasis.     At this time, plastic surgery began the oncoplastic portion of the case.  This will  be dictated separately.    She tolerated the procedure well, there were no immediate complications, and all counts were correct at the end of my portion of the case.    Dr Rey and I did discuss that if she takes a free nipple graft, that she also remove the deepithelialized tissue deep to the NAC and send it as retroareolar tissue, stitch marks dermis, in the case that the superior, anterior or lateral margins on the lumpectomy were involved- there were the margins closest to the NAC.

## 2018-09-19 NOTE — BRIEF OP NOTE
BREAST REDUCTION BILATERAL  Progress Note    Mateusz Jorgensen  9/19/2018    Pre-op Diagnosis:   Breast neoplasm, Tis (DCIS), right [D05.11]       Post-Op Diagnosis Codes:     * Breast neoplasm, Tis (DCIS), right [D05.11]    Procedure/CPT® Codes:      Procedure(s):  RIGHT needle localized lumpectomy, followed by oncoplastic closure and possible contralateral Mastopexy for symmetry   RIGHT BREAST ONCOPLASTIC CLOSURE left breast reduction    Surgeon(s):  Roma Hernandez MD Palazzo, Michelle D., MD    Anesthesia: General    Staff:   Circulator: Mukesh Chapa RN; Madisyn Diggs RN  Scrub Person: Allison Paul; Shanta Kelley; Jayne Monetro    Estimated Blood Loss: 50 cc    Urine Voided: 120 cc    Specimens:                ID Type Source Tests Collected by Time   A : Right Lumpectomy Tissue Breast, Right TISSUE PATHOLOGY EXAM Roma Hernandez MD 9/19/2018 1426   B : Anterior Tissue Breast, Right TISSUE PATHOLOGY EXAM Roma Hernandez MD 9/19/2018 1430   C : Superior Tissue Breast, Right TISSUE PATHOLOGY EXAM Roma Hernandez MD 9/19/2018 1430   D : Lateral Tissue Breast, Right TISSUE PATHOLOGY EXAM Roma Hernandez MD 9/19/2018 1430   E : Inferior Tissue Breast, Right TISSUE PATHOLOGY EXAM Roma Hernandez MD 9/19/2018 1430   F : Medial Tissue Breast, Right TISSUE PATHOLOGY EXAM Roma Hernandez MD 9/19/2018 1430   G : Posterior Tissue Breast, Right TISSUE PATHOLOGY EXAM Roma Hernandez MD 9/19/2018 1430   H : Left Breast Tissue Tissue Breast, Left TISSUE PATHOLOGY EXAM Yarelis Rey MD 9/19/2018 1554   I : Right Breast Tissue Tissue Breast, Right TISSUE PATHOLOGY EXAM Roma Hernandez MD 9/19/2018 1556         Drains:   Closed/Suction Drain 1 Left Breast Bulb (Active)       Closed/Suction Drain 2 Left Breast Bulb (Active)       Urethral Catheter Double-lumen 16 Fr. (Active)       Findings:   B inferior pedicle reduction/oncoplastic closure  L=212g  R = 18g  lump+10 g margins+188g additional tissue    Complications: none      Yarelis Rey MD     Date: 9/19/2018  Time: 5:44 PM

## 2018-09-19 NOTE — ANESTHESIA PREPROCEDURE EVALUATION
Anesthesia Evaluation     Patient summary reviewed and Nursing notes reviewed   NPO Solid Status: > 8 hours  NPO Liquid Status: > 2 hours           Airway   Mallampati: II  Neck ROM: full  No difficulty expected  Dental      Comment: crowns    Pulmonary     breath sounds clear to auscultation  Cardiovascular     Rhythm: regular        Neuro/Psych  GI/Hepatic/Renal/Endo    (+) obesity,       Musculoskeletal     Abdominal   (+) obese,    Substance History      OB/GYN          Other      history of cancer                    Anesthesia Plan    ASA 2     general     intravenous induction   Anesthetic plan, all risks, benefits, and alternatives have been provided, discussed and informed consent has been obtained with: patient.

## 2018-09-19 NOTE — NURSING NOTE
DR. CAPPS PHONED, LEFTMESSAGE, NEED HER TO CALL  SINCE WE HAVE HAVE BEEN UNSUCCESSFUL IN CONTACTING HER 2 X PER PHONE SERVCE. AWAITING RESPONSE

## 2018-09-19 NOTE — ANESTHESIA PROCEDURE NOTES
Airway  Urgency: elective    Date/Time: 9/19/2018 1:38 PM  Airway not difficult    General Information and Staff    Patient location during procedure: OR  Anesthesiologist: SEA LLANOS  CRNA: PARISH GOMES    Indications and Patient Condition  Indications for airway management: airway protection    Preoxygenated: yes  MILS maintained throughout  Mask difficulty assessment: 1 - vent by mask    Final Airway Details  Final airway type: endotracheal airway      Successful airway: ETT  Cuffed: yes   Successful intubation technique: direct laryngoscopy  Endotracheal tube insertion site: oral  Blade: Kristine  Blade size: 3  ETT size: 7.0 mm  Cormack-Lehane Classification: grade I - full view of glottis  Placement verified by: chest auscultation and capnometry   Measured from: teeth  ETT to teeth (cm): 20  Number of attempts at approach: 1

## 2018-09-20 VITALS
HEART RATE: 70 BPM | RESPIRATION RATE: 16 BRPM | BODY MASS INDEX: 31.73 KG/M2 | TEMPERATURE: 98.2 F | DIASTOLIC BLOOD PRESSURE: 70 MMHG | SYSTOLIC BLOOD PRESSURE: 107 MMHG | WEIGHT: 197.44 LBS | HEIGHT: 66 IN | OXYGEN SATURATION: 94 %

## 2018-09-20 PROCEDURE — 99024 POSTOP FOLLOW-UP VISIT: CPT | Performed by: SURGERY

## 2018-09-20 RX ORDER — HYDROCODONE BITARTRATE AND ACETAMINOPHEN 7.5; 325 MG/1; MG/1
1 TABLET ORAL ONCE AS NEEDED
Status: CANCELLED | OUTPATIENT
Start: 2018-09-20 | End: 2018-09-30

## 2018-09-20 RX ADMIN — HYDROCODONE BITARTRATE AND ACETAMINOPHEN 2 TABLET: 7.5; 325 TABLET ORAL at 11:05

## 2018-09-20 RX ADMIN — CEPHALEXIN 500 MG: 500 CAPSULE ORAL at 06:38

## 2018-09-20 RX ADMIN — CEPHALEXIN 500 MG: 500 CAPSULE ORAL at 11:06

## 2018-09-20 RX ADMIN — HYDROCODONE BITARTRATE AND ACETAMINOPHEN 2 TABLET: 7.5; 325 TABLET ORAL at 04:12

## 2018-09-20 NOTE — H&P
24 hour update from preop H&P:  Patient was operated on for R lumpectomy by Dr. Hernandez (see complete H& P preop)  She had an oncoplastic closure and symmetry procedure. Post op she had excessive pain and was lethargic and desaturated off oxygen when given narcotics. She was admitted overnight for pain control and oxygen sat monitoring.

## 2018-09-20 NOTE — PROGRESS NOTES
INPATIENT ROUNDING NOTE    Postoperative day: 1    Overnight events:     Patient has done well overnight.    no nausea  She reports that her pain is responding favorably to the prescribed meds.  She has voided since her catheter has been removed.  She has ambulated.    Exam:  Temp:  [97.5 °F (36.4 °C)-98.9 °F (37.2 °C)] 98.2 °F (36.8 °C)  Heart Rate:  [62-90] 62  Resp:  [14-18] 16  BP: (107-162)/() 111/70     UOP 850cc  Drain 50, 40    On examination, her incisions are dressed and dressings clean dry and intact.  Her nipples both well perfused.  There are no undrained fluid collections.      Assessment and plan:    Breast cancer, postoperative day 1.  Doing well.  Likely home today after drain teaching and breakfast and/or lunch. Per Dr Rey.    She has an appointment with me in the office already scheduled for her postop visit.

## 2018-09-20 NOTE — PLAN OF CARE
Problem: Patient Care Overview  Goal: Plan of Care Review  Outcome: Ongoing (interventions implemented as appropriate)   09/20/18 1618   Coping/Psychosocial   Plan of Care Reviewed With patient   Plan of Care Review   Progress improving   OTHER   Outcome Summary VSS, C/O PAIN MEDICATED, TOLERATING CLEAR LIQUID DIET, ENCOURAGE TO INCREASE FLUID INTAKE AND TO USE INCENTIVE SPIROMETER, VOIDING W/O DIFFICULTY, INCREASE DIET TO REGULAR IN AM.     Goal: Individualization and Mutuality  Outcome: Ongoing (interventions implemented as appropriate)    Goal: Discharge Needs Assessment  Outcome: Ongoing (interventions implemented as appropriate)    Goal: Interprofessional Rounds/Family Conf  Outcome: Ongoing (interventions implemented as appropriate)      Problem: Pain, Acute (Adult)  Goal: Identify Related Risk Factors and Signs and Symptoms  Outcome: Ongoing (interventions implemented as appropriate)    Goal: Acceptable Pain Control/Comfort Level  Outcome: Ongoing (interventions implemented as appropriate)

## 2018-09-20 NOTE — PROGRESS NOTES
9/19/18 9/10/18 8/16/18 7/25/18   /70 135/82 149/97 141/86   Heart Rate 62 72 84 75   Resp 16 20 -- 18   Temp 98.2 °F (36.8 °C) 98.2 °F (36.8 °C) -- 98.7 °F (37.1 °C)   Temp src Oral Oral -- Oral   SpO2 96 % 97 % 97 % 98 %     OD #1 B oncoplastic closure  No pain this am  Ambulating  Taking po liquids and crackers    Breasts symmetric  Nipples pink and sensate B  Incisions c/d/i  SHANNON's with serosanguinous drainage    A/P POD #1 R lunpectomy with oncoplastic closure and L reduction for symmetry  Doing well this am  Pain better controlled  Needs to eat meal this am then can be d/c'd    Patient has all instructions postop and Rx   Drain teaching  Fu with me next thursday

## 2018-09-20 NOTE — DISCHARGE SUMMARY
Patient was operated on 9/19/18 for R lumpectomy by Dr. Hernandez (see complete H& P preop)  She had an oncoplastic closure and symmetry procedure. Post op she had excessive pain and was lethargic and desaturated off oxygen when given narcotics. She was admitted overnight for pain control and oxygen sat monitoring.     Overnight she has done fine  Ambulating,  taking po, pain controlled  Will d/c to home today    PAtient has all Rx at home and has been given instructions at preop visit.

## 2018-09-21 ENCOUNTER — TELEPHONE (OUTPATIENT)
Dept: SURGERY | Facility: CLINIC | Age: 53
End: 2018-09-21

## 2018-09-21 NOTE — TELEPHONE ENCOUNTER
Pathology from 9-19-18-right lumpectomy for atypical hyperplasia and papilloma returned as biopsy site change, adjacent intraductal papilloma, no atypia.  Focus of atypical duct hyperplasia within 1 mm of the superior margin.  Additional superior margin shows fibrocystic change and fibroadenoma.  No atypia.  The additional shave margins returned as benign. Posterior margin with papilloma, 2 mm. No atypia.  I called and let her know.  Niplpe spared and likely no radiation.    Final Diagnosis   1.  RIGHT BREAST, LUMPECTOMY SPECIMEN:                BIOPSY SITE CHANGE WITH CLIP.               ADJACENT INTRADUCTAL PAPILLOMA, 2 MM, WITHOUT ATYPIA.                FOCUS OF ATYPICAL DUCT HYPERPLASIA (1 MM), WITHIN 1 MM OF THE SUPERIOR MARGIN.                 FOCI OF NONATYPICAL DUCT HYPERPLASIA AND COLUMNAR CELL ALTERATION.                FIBROADENOMA (2 MM).                PATCHY FIBROADENOMATOID STROMAL CHANGE.                NO ATYPIA, INCLUDING AT THE INKED MARGIN.               NEGATIVE SKIN WITH FOCAL FIBROUS SCAR.                NO ATTACHED LYMPH NODES.      2.  RIGHT BREAST, ADDITIONAL ANTERIOR MARGIN, RESECTION SPECIMEN:                NEGATIVE SKIN.      3.  RIGHT BREAST ADDITIONAL SUPERIOR MARGIN, RESECTION SPECIMEN:                FIBROCYSTIC CHANGE.                FIBROADENOMA (3 MM).                FOCAL NONATYPICAL DUCT HYPERPLASIA AND COLUMNAR CELL ALTERATION.                NO ATYPIA, INCLUDING AT THE NEW INKED MARGIN.      4.  RIGHT BREAST, ADDITIONAL LATERAL MARGIN, RESECTION SPECIMEN:                FIBROCYSTIC CHANGE.               PATCHY FIBROADENOMATOID STROMAL CHANGE.                NONATYPICAL DUCT HYPERPLASIA AND COLUMNAR CELL ALTERATION.                RARE MICROCALCIFICATIONS.                NO ATYPIA, INCLUDING AT THE NEW INKED MARGIN.       5.  RIGHT BREAST, ADDITIONAL INFERIOR MARGIN, RESECTION SPECIMEN:                FIBROCYSTIC CHANGE.               FOCAL NONATYPICAL DUCT HYPERPLASIA AND  COLUMNAR CELL ALTERATION.                PATCHY STROMAL FIBROSIS.                NO ATYPIA, INCLUDING AT THE NEW INKED MARGIN.       6.  RIGHT BREAST TISSUE, ADDITIONAL MEDIAL MARGIN, RESECTION SPECIMEN:                FIBROCYSTIC CHANGE.               FOCAL SCLEROSIS ADENOSIS.                FOCAL CHRONIC PERIDUCTAL INFLAMMATION.                PATCHY FIBROADENOMATOID STROMAL CHANGE.                RARE MICROCALCIFICATIONS.                FOCAL NONATYPICAL DUCT HYPERPLASIA.                NO ATYPIA,  INCLUDING AT THE NEW INKED MARGIN.     7.  RIGHT BREAST TISSUE, ADDITIONAL POSTERIOR MARGIN, RESECTION SPECIMEN:               FIBROCYSTIC CHANGE.               INTRADUCTAL PAPILLOMA, 2 MM, WITH MARKED INTERFERING CAUTERY ARTIFACT, AT MARGIN.               SCLEROSING ADENOSIS AND STROMAL FIBROSIS.               FOCAL NONATYPICAL DUCT HYPERPLASIA.               FOCAL CHRONIC PERIDUCTAL INFLAMMATION.               NO DEFINITE ATYPIA AT THE NEW INKED MARGIN (CAUTERIZED PAPILLOMA AT MARGIN AS NOTED).     8.  RIGHT BREAST TISSUE, REDUCTION MAMMOPLASTY SPECIMEN:                FIBROCYSTIC CHANGE.               MICROCYST FORMATION.               FOCAL NONATYPICAL DUCT HYPERPLASIA AND COLUMNAR CELL ALTERATION.               NEGATIVE SKIN.                 SCLEROSING ADENOSIS.       9.  LEFT BREAST TISSUE, REDUCTION MAMMOPLASTY SPECIMEN:                FIBROCYSTIC CHANGE.                FOCAL NONATYPICAL DUCT HYPERPLASIA AND COLUMNAR CELL ALTERATION.               MICROCYST FORMATION.                SCLEROSING ADENOSIS.                FOCAL FIBROADENOMATOID CHANGE.                 NEGATIVE SKIN.       MEGHAN/brb   [facility]/a/CMK

## 2018-10-02 ENCOUNTER — OFFICE VISIT (OUTPATIENT)
Dept: SURGERY | Facility: CLINIC | Age: 53
End: 2018-10-02

## 2018-10-02 VITALS
BODY MASS INDEX: 31.18 KG/M2 | DIASTOLIC BLOOD PRESSURE: 88 MMHG | SYSTOLIC BLOOD PRESSURE: 126 MMHG | OXYGEN SATURATION: 94 % | WEIGHT: 194 LBS | HEART RATE: 82 BPM | HEIGHT: 66 IN

## 2018-10-02 DIAGNOSIS — Z80.3 FH: BREAST CANCER: ICD-10-CM

## 2018-10-02 DIAGNOSIS — R92.1 BREAST CALCIFICATIONS ON MAMMOGRAM: ICD-10-CM

## 2018-10-02 DIAGNOSIS — Z13.79 ASHKENAZI JEWISH ANCESTRY REQUIRING POPULATION-SPECIFIC GENETIC SCREENING: ICD-10-CM

## 2018-10-02 DIAGNOSIS — R92.8 ABNORMAL ULTRASOUND OF BREAST: ICD-10-CM

## 2018-10-02 DIAGNOSIS — N60.91 ATYPICAL DUCTAL HYPERPLASIA OF RIGHT BREAST: Primary | ICD-10-CM

## 2018-10-02 PROCEDURE — 99024 POSTOP FOLLOW-UP VISIT: CPT | Performed by: SURGERY
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
## 2018-10-16 ENCOUNTER — CONSULT (OUTPATIENT)
Dept: RADIATION ONCOLOGY | Facility: HOSPITAL | Age: 53
End: 2018-10-16

## 2018-10-16 ENCOUNTER — APPOINTMENT (OUTPATIENT)
Dept: RADIATION ONCOLOGY | Facility: HOSPITAL | Age: 53
End: 2018-10-16

## 2018-10-16 VITALS
OXYGEN SATURATION: 96 % | SYSTOLIC BLOOD PRESSURE: 135 MMHG | TEMPERATURE: 98.7 F | WEIGHT: 196.5 LBS | BODY MASS INDEX: 31.58 KG/M2 | HEART RATE: 69 BPM | HEIGHT: 66 IN | DIASTOLIC BLOOD PRESSURE: 81 MMHG

## 2018-10-16 DIAGNOSIS — D05.11 BREAST NEOPLASM, TIS (DCIS), RIGHT: Primary | ICD-10-CM

## 2018-10-16 PROCEDURE — G0463 HOSPITAL OUTPT CLINIC VISIT: HCPCS | Performed by: RADIOLOGY

## 2018-10-16 PROCEDURE — 99203 OFFICE O/P NEW LOW 30 MIN: CPT | Performed by: RADIOLOGY
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
## 2018-10-25 ENCOUNTER — LAB (OUTPATIENT)
Dept: LAB | Facility: HOSPITAL | Age: 53
End: 2018-10-25

## 2018-10-25 ENCOUNTER — APPOINTMENT (OUTPATIENT)
Dept: ONCOLOGY | Facility: CLINIC | Age: 53
End: 2018-10-25

## 2018-10-25 ENCOUNTER — CONSULT (OUTPATIENT)
Dept: ONCOLOGY | Facility: CLINIC | Age: 53
End: 2018-10-25

## 2018-10-25 VITALS
WEIGHT: 192.2 LBS | HEIGHT: 66 IN | HEART RATE: 74 BPM | SYSTOLIC BLOOD PRESSURE: 150 MMHG | DIASTOLIC BLOOD PRESSURE: 96 MMHG | TEMPERATURE: 97.9 F | BODY MASS INDEX: 30.89 KG/M2 | OXYGEN SATURATION: 99 % | RESPIRATION RATE: 14 BRPM

## 2018-10-25 DIAGNOSIS — D05.11 BREAST NEOPLASM, TIS (DCIS), RIGHT: Primary | ICD-10-CM

## 2018-10-25 DIAGNOSIS — R79.89 ABNORMAL CBC: Primary | ICD-10-CM

## 2018-10-25 DIAGNOSIS — D05.11 BREAST NEOPLASM, TIS (DCIS), RIGHT: ICD-10-CM

## 2018-10-25 LAB
BASOPHILS # BLD AUTO: 0.05 10*3/MM3 (ref 0–0.1)
BASOPHILS NFR BLD AUTO: 0.7 % (ref 0–1.1)
DEPRECATED RDW RBC AUTO: 38.2 FL (ref 37–49)
EOSINOPHIL # BLD AUTO: 0.06 10*3/MM3 (ref 0–0.36)
EOSINOPHIL NFR BLD AUTO: 0.8 % (ref 1–5)
ERYTHROCYTE [DISTWIDTH] IN BLOOD BY AUTOMATED COUNT: 12.5 % (ref 11.7–14.5)
HCT VFR BLD AUTO: 41 % (ref 34–45)
HGB BLD-MCNC: 13.9 G/DL (ref 11.5–14.9)
IMM GRANULOCYTES # BLD: 0.02 10*3/MM3 (ref 0–0.03)
IMM GRANULOCYTES NFR BLD: 0.3 % (ref 0–0.5)
LYMPHOCYTES # BLD AUTO: 1.87 10*3/MM3 (ref 1–3.5)
LYMPHOCYTES NFR BLD AUTO: 26.2 % (ref 20–49)
MCH RBC QN AUTO: 28.4 PG (ref 27–33)
MCHC RBC AUTO-ENTMCNC: 33.9 G/DL (ref 32–35)
MCV RBC AUTO: 83.8 FL (ref 83–97)
MONOCYTES # BLD AUTO: 0.5 10*3/MM3 (ref 0.25–0.8)
MONOCYTES NFR BLD AUTO: 7 % (ref 4–12)
NEUTROPHILS # BLD AUTO: 4.64 10*3/MM3 (ref 1.5–7)
NEUTROPHILS NFR BLD AUTO: 65 % (ref 39–75)
NRBC BLD MANUAL-RTO: 0 /100 WBC (ref 0–0)
PLATELET # BLD AUTO: 234 10*3/MM3 (ref 150–375)
PMV BLD AUTO: 11.2 FL (ref 8.9–12.1)
RBC # BLD AUTO: 4.89 10*6/MM3 (ref 3.9–5)
WBC NRBC COR # BLD: 7.14 10*3/MM3 (ref 4–10)

## 2018-10-25 PROCEDURE — 36415 COLL VENOUS BLD VENIPUNCTURE: CPT | Performed by: INTERNAL MEDICINE

## 2018-10-25 PROCEDURE — 83002 ASSAY OF GONADOTROPIN (LH): CPT | Performed by: INTERNAL MEDICINE

## 2018-10-25 PROCEDURE — 85025 COMPLETE CBC W/AUTO DIFF WBC: CPT | Performed by: INTERNAL MEDICINE

## 2018-10-25 PROCEDURE — 99245 OFF/OP CONSLTJ NEW/EST HI 55: CPT | Performed by: INTERNAL MEDICINE

## 2018-10-25 PROCEDURE — 83001 ASSAY OF GONADOTROPIN (FSH): CPT | Performed by: INTERNAL MEDICINE

## 2018-10-25 RX ORDER — ACETAMINOPHEN,DIPHENHYDRAMINE HCL 500; 25 MG/1; MG/1
1 TABLET, FILM COATED ORAL NIGHTLY PRN
COMMUNITY
End: 2019-10-28
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
## 2018-10-26 LAB
ESTRADIOL SERPL HS-MCNC: 15.3 PG/ML
FSH SERPL-ACNC: 34.99 MIU/ML
LH SERPL-ACNC: 15.61 MIU/ML

## 2018-10-31 ENCOUNTER — OFFICE VISIT (OUTPATIENT)
Dept: OBSTETRICS AND GYNECOLOGY | Facility: CLINIC | Age: 53
End: 2018-10-31

## 2018-10-31 VITALS
DIASTOLIC BLOOD PRESSURE: 74 MMHG | SYSTOLIC BLOOD PRESSURE: 120 MMHG | HEIGHT: 66 IN | BODY MASS INDEX: 31.02 KG/M2 | WEIGHT: 193 LBS

## 2018-10-31 DIAGNOSIS — Z13.9 SCREENING FOR CONDITION: Primary | ICD-10-CM

## 2018-10-31 DIAGNOSIS — D05.11 BREAST NEOPLASM, TIS (DCIS), RIGHT: ICD-10-CM

## 2018-10-31 DIAGNOSIS — Z01.419 WELL WOMAN EXAM WITH ROUTINE GYNECOLOGICAL EXAM: ICD-10-CM

## 2018-10-31 LAB
BILIRUB BLD-MCNC: NEGATIVE MG/DL
CLARITY, POC: CLEAR
COLOR UR: YELLOW
GLUCOSE UR STRIP-MCNC: NEGATIVE MG/DL
KETONES UR QL: NEGATIVE
LEUKOCYTE EST, POC: NEGATIVE
NITRITE UR-MCNC: NEGATIVE MG/ML
PH UR: 5 [PH] (ref 5–8)
PROT UR STRIP-MCNC: NEGATIVE MG/DL
RBC # UR STRIP: NEGATIVE /UL
SP GR UR: 1 (ref 1–1.03)
UROBILINOGEN UR QL: NORMAL

## 2018-10-31 PROCEDURE — 99396 PREV VISIT EST AGE 40-64: CPT | Performed by: OBSTETRICS & GYNECOLOGY

## 2018-10-31 PROCEDURE — 81002 URINALYSIS NONAUTO W/O SCOPE: CPT | Performed by: OBSTETRICS & GYNECOLOGY

## 2018-11-02 LAB
CYTOLOGIST CVX/VAG CYTO: NORMAL
CYTOLOGY CVX/VAG DOC THIN PREP: NORMAL
DX ICD CODE: NORMAL
HIV 1 & 2 AB SER-IMP: NORMAL
HPV I/H RISK 1 DNA CVX QL PROBE+SIG AMP: NEGATIVE
OTHER STN SPEC: NORMAL
PATH REPORT.FINAL DX SPEC: NORMAL
STAT OF ADQ CVX/VAG CYTO-IMP: NORMAL

## 2018-11-16 ENCOUNTER — HOSPITAL ENCOUNTER (OUTPATIENT)
Dept: BONE DENSITY | Facility: HOSPITAL | Age: 53
Discharge: HOME | End: 2018-11-16
Attending: INTERNAL MEDICINE | Admitting: INTERNAL MEDICINE

## 2018-11-16 DIAGNOSIS — D05.11 BREAST NEOPLASM, TIS (DCIS), RIGHT: ICD-10-CM

## 2018-11-16 PROCEDURE — 77080 DXA BONE DENSITY AXIAL: CPT

## 2018-11-27 ENCOUNTER — TELEPHONE (OUTPATIENT)
Dept: SURGERY | Facility: CLINIC | Age: 53
End: 2018-11-27

## 2018-11-28 ENCOUNTER — PREP FOR SURGERY (OUTPATIENT)
Dept: OTHER | Facility: HOSPITAL | Age: 53
End: 2018-11-28

## 2018-11-28 DIAGNOSIS — Z12.11 SCREENING FOR COLON CANCER: Primary | ICD-10-CM

## 2018-11-29 PROBLEM — Z12.11 SCREENING FOR COLON CANCER: Status: ACTIVE | Noted: 2018-11-29

## 2019-01-02 ENCOUNTER — TELEPHONE (OUTPATIENT)
Dept: SURGERY | Facility: CLINIC | Age: 54
End: 2019-01-02

## 2019-01-02 ENCOUNTER — HOSPITAL ENCOUNTER (OUTPATIENT)
Dept: ULTRASOUND IMAGING | Facility: HOSPITAL | Age: 54
Discharge: HOME | End: 2019-01-02
Attending: SURGERY

## 2019-01-02 DIAGNOSIS — R92.8 ABNORMAL ULTRASOUND OF BREAST: ICD-10-CM

## 2019-01-02 NOTE — TELEPHONE ENCOUNTER
Mateusz was scheduled and planned for:     She has a left ultrasound visible mass that was felt to be BI-RADS 3 that would be due in January 2019.   I will therefore see her back in January after her ultrasound [facility] [place].     Her order says right, I confirmed the above with Sonya in U/S and she agreed to do left U/S not right.       Patient showed in U/S today and refused to having U/S done, states she was not aware she needed left breast U/S. Plan confirmed with patient and Dr. Hernandez.   Patient needs left breast U/S as short term follow up as recommended by Radiologist.   She did agree to have done. Lml

## 2019-01-08 ENCOUNTER — HOSPITAL ENCOUNTER (OUTPATIENT)
Dept: ULTRASOUND IMAGING | Facility: HOSPITAL | Age: 54
Discharge: HOME | End: 2019-01-08
Attending: SURGERY | Admitting: SURGERY

## 2019-01-08 ENCOUNTER — TELEPHONE (OUTPATIENT)
Dept: SURGERY | Facility: CLINIC | Age: 54
End: 2019-01-08

## 2019-01-08 PROCEDURE — 76642 ULTRASOUND BREAST LIMITED: CPT

## 2019-01-08 NOTE — TELEPHONE ENCOUNTER
June 8, 2019 left breast ultrasound: 1.5 cm nodule with echogenic focus compatible with benign intramammary lymph node.  Unchanged.  Correlates with a small T2 enhancing nodule on MRI from August 2018.  No other masses identified.

## 2019-01-14 ENCOUNTER — OFFICE VISIT (OUTPATIENT)
Dept: SURGERY | Facility: CLINIC | Age: 54
End: 2019-01-14

## 2019-01-14 VITALS
HEART RATE: 63 BPM | OXYGEN SATURATION: 98 % | WEIGHT: 194 LBS | HEIGHT: 66 IN | SYSTOLIC BLOOD PRESSURE: 118 MMHG | DIASTOLIC BLOOD PRESSURE: 64 MMHG | BODY MASS INDEX: 31.18 KG/M2

## 2019-01-14 DIAGNOSIS — R92.1 BREAST CALCIFICATIONS ON MAMMOGRAM: ICD-10-CM

## 2019-01-14 DIAGNOSIS — Z80.3 FH: BREAST CANCER: ICD-10-CM

## 2019-01-14 DIAGNOSIS — N60.91 ATYPICAL DUCTAL HYPERPLASIA OF RIGHT BREAST: Primary | ICD-10-CM

## 2019-01-14 DIAGNOSIS — R92.8 ABNORMAL ULTRASOUND OF BREAST: ICD-10-CM

## 2019-01-14 PROCEDURE — 99213 OFFICE O/P EST LOW 20 MIN: CPT | Performed by: SURGERY
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
## 2019-01-17 ENCOUNTER — APPOINTMENT (OUTPATIENT)
Dept: LAB | Facility: HOSPITAL | Age: 54
End: 2019-01-17

## 2019-01-17 ENCOUNTER — OFFICE VISIT (OUTPATIENT)
Dept: ONCOLOGY | Facility: CLINIC | Age: 54
End: 2019-01-17

## 2019-01-17 VITALS
WEIGHT: 195 LBS | HEART RATE: 71 BPM | DIASTOLIC BLOOD PRESSURE: 76 MMHG | RESPIRATION RATE: 16 BRPM | TEMPERATURE: 97.7 F | HEIGHT: 66 IN | SYSTOLIC BLOOD PRESSURE: 121 MMHG | BODY MASS INDEX: 31.34 KG/M2 | OXYGEN SATURATION: 95 %

## 2019-01-17 DIAGNOSIS — D05.11 BREAST NEOPLASM, TIS (DCIS), RIGHT: Primary | ICD-10-CM

## 2019-01-17 LAB
BASOPHILS # BLD AUTO: 0.05 10*3/MM3 (ref 0–0.1)
BASOPHILS NFR BLD AUTO: 0.7 % (ref 0–1.1)
DEPRECATED RDW RBC AUTO: 37.5 FL (ref 37–49)
EOSINOPHIL # BLD AUTO: 0.13 10*3/MM3 (ref 0–0.36)
EOSINOPHIL NFR BLD AUTO: 1.9 % (ref 1–5)
ERYTHROCYTE [DISTWIDTH] IN BLOOD BY AUTOMATED COUNT: 12.4 % (ref 11.7–14.5)
HCT VFR BLD AUTO: 42 % (ref 34–45)
HGB BLD-MCNC: 14.1 G/DL (ref 11.5–14.9)
IMM GRANULOCYTES # BLD AUTO: 0.04 10*3/MM3 (ref 0–0.03)
IMM GRANULOCYTES NFR BLD AUTO: 0.6 % (ref 0–0.5)
LYMPHOCYTES # BLD AUTO: 1.96 10*3/MM3 (ref 1–3.5)
LYMPHOCYTES NFR BLD AUTO: 28.2 % (ref 20–49)
MCH RBC QN AUTO: 28.3 PG (ref 27–33)
MCHC RBC AUTO-ENTMCNC: 33.6 G/DL (ref 32–35)
MCV RBC AUTO: 84.2 FL (ref 83–97)
MONOCYTES # BLD AUTO: 0.74 10*3/MM3 (ref 0.25–0.8)
MONOCYTES NFR BLD AUTO: 10.7 % (ref 4–12)
NEUTROPHILS # BLD AUTO: 4.02 10*3/MM3 (ref 1.5–7)
NEUTROPHILS NFR BLD AUTO: 57.9 % (ref 39–75)
NRBC BLD AUTO-RTO: 0 /100 WBC (ref 0–0)
PLATELET # BLD AUTO: 248 10*3/MM3 (ref 150–375)
PMV BLD AUTO: 10.4 FL (ref 8.9–12.1)
RBC # BLD AUTO: 4.99 10*6/MM3 (ref 3.9–5)
WBC NRBC COR # BLD: 6.94 10*3/MM3 (ref 4–10)

## 2019-01-17 PROCEDURE — 36416 COLLJ CAPILLARY BLOOD SPEC: CPT | Performed by: INTERNAL MEDICINE

## 2019-01-17 PROCEDURE — 99214 OFFICE O/P EST MOD 30 MIN: CPT | Performed by: INTERNAL MEDICINE

## 2019-01-17 PROCEDURE — 85025 COMPLETE CBC W/AUTO DIFF WBC: CPT | Performed by: INTERNAL MEDICINE
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
## 2019-01-18 ENCOUNTER — ANESTHESIA (OUTPATIENT)
Dept: GASTROENTEROLOGY | Facility: HOSPITAL | Age: 54
End: 2019-01-18

## 2019-01-18 ENCOUNTER — ANESTHESIA EVENT (OUTPATIENT)
Dept: GASTROENTEROLOGY | Facility: HOSPITAL | Age: 54
End: 2019-01-18

## 2019-01-18 ENCOUNTER — HOSPITAL ENCOUNTER (OUTPATIENT)
Facility: HOSPITAL | Age: 54
Setting detail: HOSPITAL OUTPATIENT SURGERY
Discharge: HOME | End: 2019-01-18
Attending: INTERNAL MEDICINE | Admitting: INTERNAL MEDICINE

## 2019-01-18 VITALS
HEIGHT: 66 IN | OXYGEN SATURATION: 95 % | RESPIRATION RATE: 15 BRPM | BODY MASS INDEX: 30.71 KG/M2 | TEMPERATURE: 98.2 F | HEART RATE: 72 BPM | WEIGHT: 191.1 LBS | SYSTOLIC BLOOD PRESSURE: 115 MMHG | DIASTOLIC BLOOD PRESSURE: 68 MMHG

## 2019-01-18 PROCEDURE — 25010000002 PROPOFOL 10 MG/ML EMULSION: Performed by: ANESTHESIOLOGY

## 2019-01-18 PROCEDURE — 45378 DIAGNOSTIC COLONOSCOPY: CPT | Performed by: INTERNAL MEDICINE

## 2019-01-18 RX ORDER — SODIUM CHLORIDE 0.9 % (FLUSH) 0.9 %
3-10 SYRINGE (ML) INJECTION AS NEEDED
Status: DISCONTINUED | OUTPATIENT
Start: 2019-01-18 | End: 2019-01-18 | Stop reason: HOSPADM

## 2019-01-18 RX ORDER — SODIUM CHLORIDE 0.9 % (FLUSH) 0.9 %
3 SYRINGE (ML) INJECTION EVERY 12 HOURS SCHEDULED
Status: DISCONTINUED | OUTPATIENT
Start: 2019-01-18 | End: 2019-01-18 | Stop reason: HOSPADM

## 2019-01-18 RX ORDER — PROPOFOL 10 MG/ML
VIAL (ML) INTRAVENOUS AS NEEDED
Status: DISCONTINUED | OUTPATIENT
Start: 2019-01-18 | End: 2019-01-18 | Stop reason: SURG

## 2019-01-18 RX ORDER — PROPOFOL 10 MG/ML
VIAL (ML) INTRAVENOUS CONTINUOUS PRN
Status: DISCONTINUED | OUTPATIENT
Start: 2019-01-18 | End: 2019-01-18 | Stop reason: SURG

## 2019-01-18 RX ORDER — LIDOCAINE HYDROCHLORIDE 20 MG/ML
INJECTION, SOLUTION INFILTRATION; PERINEURAL AS NEEDED
Status: DISCONTINUED | OUTPATIENT
Start: 2019-01-18 | End: 2019-01-18 | Stop reason: SURG

## 2019-01-18 RX ORDER — SODIUM CHLORIDE, SODIUM LACTATE, POTASSIUM CHLORIDE, CALCIUM CHLORIDE 600; 310; 30; 20 MG/100ML; MG/100ML; MG/100ML; MG/100ML
30 INJECTION, SOLUTION INTRAVENOUS CONTINUOUS PRN
Status: DISCONTINUED | OUTPATIENT
Start: 2019-01-18 | End: 2019-01-18 | Stop reason: HOSPADM

## 2019-01-18 RX ADMIN — SODIUM CHLORIDE, POTASSIUM CHLORIDE, SODIUM LACTATE AND CALCIUM CHLORIDE 30 ML/HR: 600; 310; 30; 20 INJECTION, SOLUTION INTRAVENOUS at 08:45

## 2019-01-18 RX ADMIN — PROPOFOL 300 MCG/KG/MIN: 10 INJECTION, EMULSION INTRAVENOUS at 09:32

## 2019-01-18 RX ADMIN — LIDOCAINE HYDROCHLORIDE 60 MG: 20 INJECTION, SOLUTION INFILTRATION; PERINEURAL at 09:32

## 2019-01-18 RX ADMIN — PROPOFOL 120 MG: 10 INJECTION, EMULSION INTRAVENOUS at 09:32

## 2019-01-18 RX ADMIN — SODIUM CHLORIDE, POTASSIUM CHLORIDE, SODIUM LACTATE AND CALCIUM CHLORIDE: 600; 310; 30; 20 INJECTION, SOLUTION INTRAVENOUS at 09:26

## 2019-01-18 NOTE — ANESTHESIA PREPROCEDURE EVALUATION
Anesthesia Evaluation     Patient summary reviewed and Nursing notes reviewed                Airway   Mallampati: II  TM distance: >3 FB  Neck ROM: full  No difficulty expected  Dental - normal exam     Pulmonary - normal exam   Cardiovascular - normal exam        Neuro/Psych  GI/Hepatic/Renal/Endo    (+) obesity,       Musculoskeletal     Abdominal   (+) obese,    Substance History      OB/GYN          Other      history of cancer      Other Comment: Hx breast CA                  Anesthesia Plan    ASA 2     MAC     intravenous induction   Anesthetic plan, all risks, benefits, and alternatives have been provided, discussed and informed consent has been obtained with: patient.
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
## 2019-01-18 NOTE — BRIEF OP NOTE
COLONOSCOPY  Progress Note    Mateusz Jorgensen  1/18/2019    Pre-op Diagnosis:   Screening for colon cancer [Z12.11]       Post-Op Diagnosis Codes:     * Screening for colon cancer [Z12.11]     * Diverticulosis [K57.90]    Procedure/CPT® Codes:      Procedure(s):  COLONOSCOPY TO CECUM & T.I.    Surgeon(s):  Americo Hargrove MD    Anesthesia: Monitor Anesthesia Care    Staff:   Endo Technician: Grecia Weeks; Leslie Torres  Endo Nurse: Kim Waller RN    Estimated Blood Loss: none    Urine Voided: * No values recorded between 1/18/2019  9:23 AM and 1/18/2019  9:50 AM *    Specimens:                None      Drains:   Closed/Suction Drain 1 Left Breast Bulb (Active)       Closed/Suction Drain 2 Left Breast Bulb (Active)       Findings: Colon to TI Good Prep  Sigmoid Diverticulosis    Complications: None      Americo Hargrove MD     Date: 1/18/2019  Time: 9:52 AM

## 2019-01-18 NOTE — ANESTHESIA POSTPROCEDURE EVALUATION
Patient: Mateusz Jorgensen    Procedure Summary     Date:  01/18/19 Room / Location:  [facility] END[facility]COPY [facility] / [facility]    Anesthesia Start:  0926 Anesthesia Stop:  0957    Procedure:  COLONOSCOPY TO CECUM & T.I. (N/A ) Diagnosis:       Screening for colon cancer      Diverticulosis      (Screening for colon cancer [Z12.11])    Surgeon:  Americo Hargrove MD Provider:  Tony Perez MD    Anesthesia Type:  MAC ASA Status:  2          Anesthesia Type: MAC  Last vitals  BP   133/82 (01/18/19 0839)   Temp   36.8 °C (98.2 °F) (01/18/19 0839)   Pulse   66 (01/18/19 0839)   Resp   16 (01/18/19 0839)     SpO2   95 % (01/18/19 0839)     Post Anesthesia Care and Evaluation    Patient location during evaluation: PHASE II  Patient participation: complete - patient participated  Level of consciousness: awake and alert  Pain management: adequate  Airway patency: patent  Anesthetic complications: No anesthetic complications  PONV Status: none  Cardiovascular status: acceptable  Respiratory status: acceptable  Hydration status: acceptable

## 2019-03-20 ENCOUNTER — TELEPHONE (OUTPATIENT)
Dept: SURGERY | Facility: CLINIC | Age: 54
End: 2019-03-20

## 2019-03-20 NOTE — TELEPHONE ENCOUNTER
SPOKE TO PT IN REGARDS TO UPCOMING APPTS. YASEMIN MUÑOZ 09/06/19 11:30AM - ARRIVE 11:15AM, MRI TO FOLLOW @ 12:45PM - ARRIVE 12:15PM; F/U W/ VANIA 09/20/19 10:30AM - ARRIVE 10:15AM.    LINO

## 2019-07-15 ENCOUNTER — OFFICE VISIT (OUTPATIENT)
Dept: OBSTETRICS AND GYNECOLOGY | Facility: CLINIC | Age: 54
End: 2019-07-15

## 2019-07-15 VITALS — WEIGHT: 185.9 LBS | HEIGHT: 66 IN | BODY MASS INDEX: 29.88 KG/M2

## 2019-07-15 DIAGNOSIS — Z13.9 SCREENING FOR CONDITION: ICD-10-CM

## 2019-07-15 DIAGNOSIS — N76.0 ACUTE VAGINITIS: Primary | ICD-10-CM

## 2019-07-15 PROCEDURE — 99213 OFFICE O/P EST LOW 20 MIN: CPT | Performed by: NURSE PRACTITIONER

## 2019-07-15 RX ORDER — QUETIAPINE FUMARATE 25 MG/1
TABLET, FILM COATED ORAL
Refills: 0 | COMMUNITY
Start: 2019-04-12 | End: 2019-10-28

## 2019-07-15 RX ORDER — FLUCONAZOLE 150 MG/1
150 TABLET ORAL ONCE
Qty: 1 TABLET | Refills: 1 | Status: SHIPPED | OUTPATIENT
Start: 2019-07-15 | End: 2019-07-15

## 2019-07-15 NOTE — PROGRESS NOTES
"Subjective     Chief Complaint   Patient presents with   • Vaginitis       Mateusz Jorgensen is a 54 y.o.  whose LMP is No LMP recorded. Patient is premenopausal.. She presents with complaints of vaginal irritation since last Wednesday. She reports itching with light clear discharge. Denies odor. She reports her symptoms started on Wednesday but she had to go out of town for work.  Has not tried any OTC meds.  No change in sex partners.     She swims daily and knows there were a couple days last week that she stayed in her wet bathing suit longer than she should have. This problem is new to me, the examiner.     HPI    HPI    The following portions of the patient's history were reviewed and updated as appropriate:vital signs, allergies, current medications, past medical history, past social history, past surgical history and problem list      Review of Systems     Review of Systems   Constitutional: Negative.    Gastrointestinal: Negative.    Genitourinary: Positive for vaginal discharge.        Irritation with odor     Psychiatric/Behavioral: Negative.        Objective      Ht 167.6 cm (65.98\")   Wt 84.3 kg (185 lb 14.4 oz)   Breastfeeding? No   BMI 30.02 kg/m²     Physical Exam    Physical Exam   Constitutional: She is oriented to person, place, and time. She appears well-developed and well-nourished.   Abdominal: Soft. Bowel sounds are normal. Hernia confirmed negative in the right inguinal area and confirmed negative in the left inguinal area.   Genitourinary: Rectum normal.       Pelvic exam was performed with patient supine. There is no rash, tenderness, lesion or injury on the right labia. There is no rash, tenderness, lesion or injury on the left labia. Uterus is not deviated, not enlarged, not fixed and not tender. Cervix exhibits no motion tenderness, no discharge and no friability. Right adnexum displays no mass, no tenderness and no fullness. Left adnexum displays no mass, no tenderness and no " fullness. No erythema, tenderness or bleeding in the vagina. No foreign body in the vagina. No signs of injury around the vagina. Vaginal discharge (clear to white) found.   Genitourinary Comments: Erythema     Lymphadenopathy:        Right: No inguinal adenopathy present.        Left: No inguinal adenopathy present.   Neurological: She is alert and oriented to person, place, and time.   Skin: Skin is warm and dry.   Psychiatric: She has a normal mood and affect. Her behavior is normal.   Vitals reviewed.      Lab Review   Labs: Urinalysis - with micro     Imaging   No data reviewed    Assessment  Mateusz was seen today for vaginitis.    Diagnoses and all orders for this visit:    Acute vaginitis  -     NuSwab BV & Candida - Swab, Vagina    Screening for condition  -     POC Urinalysis Dipstick        Additional Assessment:   1) Yeast vaginitis       Plan     1. Yeast vaginitis- Exam c/w yeast. ERX fluconazole. Check NuSwab. Disc vaginal health.     2. Scheduled for: STD Labs - Nu Swab - myco, yeast, luek ,bv , Ultrasound of the -  N/A , Mammography - N/A , Bone Density Test - N/A , Additional Labs - N/A    3. Smoking status: non smoker    4.   BMI Status: Patient's Body mass index is 30.02 kg/m². BMI is above normal parameters. Recommendations include: educational material.    RTO MARJORIE Vega, ROBERTO  7/15/2019

## 2019-07-16 NOTE — TELEPHONE ENCOUNTER
Rescheduled imaging and follow up    U/S 1/8/19 8:30 am [facility]  Appt to see Dr. Hernandez 1/14/19 1:15.    I had to leave patient a message to call me back and confirm.    Lml   
Clothing

## 2019-07-23 LAB
A VAGINAE DNA VAG QL NAA+PROBE: ABNORMAL SCORE
BVAB2 DNA VAG QL NAA+PROBE: ABNORMAL SCORE
C ALBICANS DNA VAG QL NAA+PROBE: POSITIVE
C GLABRATA DNA VAG QL NAA+PROBE: NEGATIVE
M HOMINIS SPEC QL CULT: NEGATIVE
MEGA1 DNA VAG QL NAA+PROBE: ABNORMAL SCORE
U UREALYTICUM SPEC QL CULT: NEGATIVE

## 2019-09-11 ENCOUNTER — HOSPITAL ENCOUNTER (OUTPATIENT)
Dept: MRI IMAGING | Facility: HOSPITAL | Age: 54
Discharge: HOME | End: 2019-09-11

## 2019-09-11 ENCOUNTER — HOSPITAL ENCOUNTER (OUTPATIENT)
Dept: MAMMOGRAPHY | Facility: HOSPITAL | Age: 54
Discharge: HOME | End: 2019-09-11
Admitting: SURGERY

## 2019-09-11 DIAGNOSIS — N60.91 ATYPICAL DUCTAL HYPERPLASIA OF RIGHT BREAST: ICD-10-CM

## 2019-09-11 LAB — CREAT BLDA-MCNC: 0.5 MG/DL (ref 0.6–1.3)

## 2019-09-11 PROCEDURE — 82565 ASSAY OF CREATININE: CPT

## 2019-09-11 PROCEDURE — 0 GADOBENATE DIMEGLUMINE 529 MG/ML SOLUTION: Performed by: SURGERY

## 2019-09-11 PROCEDURE — 77049 MRI BREAST C-+ W/CAD BI: CPT

## 2019-09-11 PROCEDURE — 77067 SCR MAMMO BI INCL CAD: CPT

## 2019-09-11 PROCEDURE — 77063 BREAST TOMOSYNTHESIS BI: CPT

## 2019-09-11 PROCEDURE — A9577 INJ MULTIHANCE: HCPCS | Performed by: SURGERY

## 2019-09-11 RX ADMIN — GADOBENATE DIMEGLUMINE 17 ML: 529 INJECTION, SOLUTION INTRAVENOUS at 11:34

## 2019-09-12 ENCOUNTER — TELEPHONE (OUTPATIENT)
Dept: SURGERY | Facility: CLINIC | Age: 54
End: 2019-09-12

## 2019-09-12 NOTE — TELEPHONE ENCOUNTER
[facility] September 11, 2019 bilateral breast MRI: Since prior exam there has been interval excision of the atypical duct hyperplasia right breast.  At 1230, 4 cm from the nipple is a stable 1.2 cm mass consistent with intramammary lymph node or fibroadenoma.  Far posterior medial left breast 1.9 cm mass compared to prior 2.1 cm mass consistent with likely fibroadenoma.  BI-RADS 2.  September 11, 2019 bilateral screening mammogram with 3D.  Scattered fibroglandular densities.  No evidence for malignancy in either breast BI-RADS 2.

## 2019-09-27 ENCOUNTER — OFFICE VISIT (OUTPATIENT)
Dept: SURGERY | Facility: CLINIC | Age: 54
End: 2019-09-27

## 2019-09-27 VITALS
SYSTOLIC BLOOD PRESSURE: 134 MMHG | HEART RATE: 65 BPM | DIASTOLIC BLOOD PRESSURE: 82 MMHG | HEIGHT: 66 IN | WEIGHT: 183 LBS | OXYGEN SATURATION: 96 % | BODY MASS INDEX: 29.41 KG/M2

## 2019-09-27 DIAGNOSIS — N60.91 ATYPICAL DUCTAL HYPERPLASIA OF RIGHT BREAST: Primary | ICD-10-CM

## 2019-09-27 DIAGNOSIS — Z80.3 FH: BREAST CANCER: ICD-10-CM

## 2019-09-27 DIAGNOSIS — R92.1 BREAST CALCIFICATIONS ON MAMMOGRAM: ICD-10-CM

## 2019-09-27 PROCEDURE — 99213 OFFICE O/P EST LOW 20 MIN: CPT | Performed by: SURGERY
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
## 2019-10-28 ENCOUNTER — LAB (OUTPATIENT)
Dept: OTHER | Facility: HOSPITAL | Age: 54
End: 2019-10-28

## 2019-10-28 ENCOUNTER — OFFICE VISIT (OUTPATIENT)
Dept: ONCOLOGY | Facility: CLINIC | Age: 54
End: 2019-10-28

## 2019-10-28 VITALS
BODY MASS INDEX: 29.35 KG/M2 | TEMPERATURE: 97.9 F | HEART RATE: 63 BPM | HEIGHT: 66 IN | RESPIRATION RATE: 16 BRPM | DIASTOLIC BLOOD PRESSURE: 80 MMHG | WEIGHT: 182.6 LBS | SYSTOLIC BLOOD PRESSURE: 117 MMHG | OXYGEN SATURATION: 100 %

## 2019-10-28 DIAGNOSIS — D05.11 BREAST NEOPLASM, TIS (DCIS), RIGHT: Primary | ICD-10-CM

## 2019-10-28 LAB
BASOPHILS # BLD AUTO: 0.05 10*3/MM3 (ref 0–0.2)
BASOPHILS NFR BLD AUTO: 0.6 % (ref 0–1.5)
DEPRECATED RDW RBC AUTO: 37.6 FL (ref 37–54)
EOSINOPHIL # BLD AUTO: 0.09 10*3/MM3 (ref 0–0.4)
EOSINOPHIL NFR BLD AUTO: 1.1 % (ref 0.3–6.2)
ERYTHROCYTE [DISTWIDTH] IN BLOOD BY AUTOMATED COUNT: 12.4 % (ref 12.3–15.4)
HCT VFR BLD AUTO: 43.4 % (ref 34–46.6)
HGB BLD-MCNC: 14.6 G/DL (ref 12–15.9)
IMM GRANULOCYTES # BLD AUTO: 0.07 10*3/MM3 (ref 0–0.05)
IMM GRANULOCYTES NFR BLD AUTO: 0.8 % (ref 0–0.5)
LYMPHOCYTES # BLD AUTO: 2 10*3/MM3 (ref 0.7–3.1)
LYMPHOCYTES NFR BLD AUTO: 23.6 % (ref 19.6–45.3)
MCH RBC QN AUTO: 28 PG (ref 26.6–33)
MCHC RBC AUTO-ENTMCNC: 33.6 G/DL (ref 31.5–35.7)
MCV RBC AUTO: 83.1 FL (ref 79–97)
MONOCYTES # BLD AUTO: 0.64 10*3/MM3 (ref 0.1–0.9)
MONOCYTES NFR BLD AUTO: 7.5 % (ref 5–12)
NEUTROPHILS # BLD AUTO: 5.64 10*3/MM3 (ref 1.7–7)
NEUTROPHILS NFR BLD AUTO: 66.4 % (ref 42.7–76)
NRBC BLD AUTO-RTO: 0 /100 WBC (ref 0–0.2)
PLATELET # BLD AUTO: 242 10*3/MM3 (ref 140–450)
PMV BLD AUTO: 11.1 FL (ref 6–12)
RBC # BLD AUTO: 5.22 10*6/MM3 (ref 3.77–5.28)
WBC NRBC COR # BLD: 8.49 10*3/MM3 (ref 3.4–10.8)

## 2019-10-28 PROCEDURE — 85025 COMPLETE CBC W/AUTO DIFF WBC: CPT | Performed by: INTERNAL MEDICINE

## 2019-10-28 PROCEDURE — 36415 COLL VENOUS BLD VENIPUNCTURE: CPT

## 2019-10-28 PROCEDURE — 99214 OFFICE O/P EST MOD 30 MIN: CPT | Performed by: INTERNAL MEDICINE

## 2019-10-28 RX ORDER — TAMOXIFEN CITRATE 10 MG/1
10 TABLET ORAL 2 TIMES DAILY
Qty: 60 TABLET | Refills: 6 | Status: SHIPPED | OUTPATIENT
Start: 2019-10-28 | End: 2020-07-01

## 2019-10-28 RX ORDER — IBUPROFEN 200 MG
200 TABLET ORAL AS NEEDED
COMMUNITY
End: 2022-06-24
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
## 2019-11-14 ENCOUNTER — TELEPHONE (OUTPATIENT)
Dept: ONCOLOGY | Facility: HOSPITAL | Age: 54
End: 2019-11-14

## 2019-11-14 NOTE — TELEPHONE ENCOUNTER
----- Message from Judith Gomez sent at 11/14/2019 11:51 AM EST -----  Contact: [phone]  Pt calling about getting script for Tamoxiphen . She thought she was to start that med soon?    Attempted to call pt. No answer, left VM stating she should call her pharmacy to see if they have the script. Dr. Singh sent script in on 10/28.

## 2020-01-09 ENCOUNTER — TELEPHONE (OUTPATIENT)
Dept: SURGERY | Facility: CLINIC | Age: 55
End: 2020-01-09

## 2020-01-09 NOTE — TELEPHONE ENCOUNTER
Breast MRI & MMG are scheduled on 9/14/2020.    MRI arrive @ 8:15am for an *:45am MRI.    F/u appointment with Dr. Hernandez is scheduled on 9/21/2020 @ 1:00pm.    Called and spoke to patient, patient expressed verbal understanding of appointment time.

## 2020-04-30 ENCOUNTER — TELEPHONE (OUTPATIENT)
Dept: OBSTETRICS AND GYNECOLOGY | Facility: CLINIC | Age: 55
End: 2020-04-30

## 2020-04-30 RX ORDER — FLUCONAZOLE 150 MG/1
150 TABLET ORAL ONCE
Qty: 1 TABLET | Refills: 1 | Status: SHIPPED | OUTPATIENT
Start: 2020-04-30 | End: 2020-04-30

## 2020-07-01 ENCOUNTER — OFFICE VISIT (OUTPATIENT)
Dept: OBSTETRICS AND GYNECOLOGY | Facility: CLINIC | Age: 55
End: 2020-07-01

## 2020-07-01 VITALS
DIASTOLIC BLOOD PRESSURE: 86 MMHG | BODY MASS INDEX: 29.73 KG/M2 | WEIGHT: 185 LBS | SYSTOLIC BLOOD PRESSURE: 122 MMHG | HEIGHT: 66 IN

## 2020-07-01 DIAGNOSIS — D05.11 BREAST NEOPLASM, TIS (DCIS), RIGHT: ICD-10-CM

## 2020-07-01 DIAGNOSIS — N76.0 ACUTE VAGINITIS: ICD-10-CM

## 2020-07-01 DIAGNOSIS — R81 GLUCOSURIA: ICD-10-CM

## 2020-07-01 DIAGNOSIS — B37.2 YEAST DERMATITIS: ICD-10-CM

## 2020-07-01 DIAGNOSIS — Z13.9 SCREENING FOR UNSPECIFIED CONDITION: Primary | ICD-10-CM

## 2020-07-01 LAB
BILIRUB BLD-MCNC: NEGATIVE MG/DL
CLARITY, POC: CLEAR
COLOR UR: YELLOW
GLUCOSE UR STRIP-MCNC: ABNORMAL MG/DL
KETONES UR QL: NEGATIVE
LEUKOCYTE EST, POC: NEGATIVE
NITRITE UR-MCNC: NEGATIVE MG/ML
PH UR: 5 [PH] (ref 5–8)
PROT UR STRIP-MCNC: NEGATIVE MG/DL
RBC # UR STRIP: NEGATIVE /UL
SP GR UR: 1.01 (ref 1–1.03)
UROBILINOGEN UR QL: NORMAL

## 2020-07-01 PROCEDURE — 81002 URINALYSIS NONAUTO W/O SCOPE: CPT | Performed by: OBSTETRICS & GYNECOLOGY

## 2020-07-01 PROCEDURE — 99214 OFFICE O/P EST MOD 30 MIN: CPT | Performed by: OBSTETRICS & GYNECOLOGY

## 2020-07-01 RX ORDER — CLOTRIMAZOLE AND BETAMETHASONE DIPROPIONATE 10; .64 MG/G; MG/G
CREAM TOPICAL
Qty: 45 G | Refills: 0 | Status: SHIPPED | OUTPATIENT
Start: 2020-07-01 | End: 2020-08-05

## 2020-07-01 RX ORDER — FLUCONAZOLE 200 MG/1
200 TABLET ORAL DAILY
Qty: 3 TABLET | Refills: 0 | Status: SHIPPED | OUTPATIENT
Start: 2020-07-01 | End: 2020-07-04

## 2020-07-01 NOTE — PROGRESS NOTES
"      Mateusz Jorgensen is a 55 y.o. patient who presents for follow up of   Chief Complaint   Patient presents with   • Follow-up     itching and burning in vaginal area       54 yo est pt here for vaginitis. She has had 3-4 weeks of external vaginal itching and irritation. She has not been seen since 10/2018. No  VB. She  has not seen her primary care doctor in several years. She feels like she has yeast infections often. . She has significant glucosuria and has not had diabetes screening in several years.      The following portions of the patient's history were reviewed and updated as appropriate: allergies, current medications and problem list.    Review of Systems   Constitutional: Positive for activity change. Negative for appetite change, fatigue, fever and unexpected weight change.   Eyes: Negative for photophobia and visual disturbance.   Respiratory: Negative for cough and shortness of breath.    Cardiovascular: Negative for chest pain and palpitations.   Gastrointestinal: Negative for abdominal distention, abdominal pain, constipation, diarrhea and nausea.   Endocrine: Negative for cold intolerance and heat intolerance.   Genitourinary: Positive for vaginal discharge and vaginal pain. Negative for dyspareunia, dysuria, menstrual problem and pelvic pain.   Musculoskeletal: Negative for back pain.   Skin: Negative for color change and rash.   Neurological: Negative for headaches.   Hematological: Negative for adenopathy. Does not bruise/bleed easily.   Psychiatric/Behavioral: Negative for dysphoric mood. The patient is not nervous/anxious.        /86   Ht 167.6 cm (66\")   Wt 83.9 kg (185 lb)   BMI 29.86 kg/m²     Physical Exam   Constitutional: She is oriented to person, place, and time. She appears well-developed and well-nourished.   HENT:   Head: Normocephalic and atraumatic.   Eyes: Conjunctivae are normal. No scleral icterus.   Neck: Neck supple. No thyromegaly present.   Abdominal: Soft. " She exhibits no distension and no mass. There is no tenderness. There is no rebound and no guarding. No hernia.   Genitourinary: Uterus normal.       Pelvic exam was performed with patient supine. There is rash and tenderness on the right labia. There is no lesion or injury on the right labia. There is rash and tenderness on the left labia. There is no lesion or injury on the left labia. Cervix exhibits no motion tenderness, no discharge and no friability. Right adnexum displays no mass, no tenderness and no fullness. Left adnexum displays no mass, no tenderness and no fullness. No erythema, tenderness or bleeding in the vagina. No foreign body in the vagina. No signs of injury around the vagina. Vaginal discharge found.   Neurological: She is alert and oriented to person, place, and time.   Skin: Skin is warm and dry.   Psychiatric: She has a normal mood and affect. Her behavior is normal. Judgment and thought content normal.   Nursing note and vitals reviewed.      A/P:  1. Yeast dermatitis- Check NuSwab Y/M/B. Treat empirically with Diflucan 200 mg x 3 days and Lotrisone BID x 7 days.  2. Glucosuria- check HgBA1c. We discussed that this is an average of her glucose values over the past 3 months and may only be helpful if it is elevated.   3. Personal history of breast cancer- UTD MRI and MMG 9/2019.  4. UTD DEXA 11/2018  5. RTO 1 month annual and recheck vulva.    Assessment/Plan   Mateusz was seen today for follow-up.    Diagnoses and all orders for this visit:    Screening for unspecified condition  -     POC Urinalysis Dipstick    Glucosuria  -     Hemoglobin A1c    Yeast dermatitis  -     NuSwab BV & Candida - Swab, Vagina  -     Genital Mycoplasmas HILDA, Swab - Swab, Vagina    Acute vaginitis  -     NuSwab BV & Candida - Swab, Vagina  -     Genital Mycoplasmas HILDA, Swab - Swab, Vagina    Other orders  -     fluconazole (DIFLUCAN) 200 MG tablet; Take 1 tablet by mouth Daily for 3 days.  -      clotrimazole-betamethasone (Lotrisone) 1-0.05 % cream; Use on external vulva twice a day for a week                 No follow-ups on file.      Lula Min MD    7/1/2020  19:31

## 2020-07-02 LAB — HBA1C MFR BLD: 9.6 % (ref 4.8–5.6)

## 2020-07-02 NOTE — PROGRESS NOTES
I called and spoke with patient by phone about her diabetes diagnosis.  She is going to try to reach out and get an appointment with Dr. Wall.  I have encouraged her to call our office if she needs help getting arranged to see primary care or endocrinology.

## 2020-07-13 LAB
A VAGINAE DNA VAG QL NAA+PROBE: ABNORMAL SCORE
BVAB2 DNA VAG QL NAA+PROBE: ABNORMAL SCORE
C ALBICANS DNA VAG QL NAA+PROBE: POSITIVE
C GLABRATA DNA VAG QL NAA+PROBE: NEGATIVE
M GENITALIUM DNA SPEC QL NAA+PROBE: NEGATIVE
M HOMINIS DNA SPEC QL NAA+PROBE: NEGATIVE
MEGA1 DNA VAG QL NAA+PROBE: ABNORMAL SCORE
UREAPLASMA DNA SPEC QL NAA+PROBE: POSITIVE

## 2020-07-13 RX ORDER — AZITHROMYCIN 250 MG/1
TABLET, FILM COATED ORAL
Qty: 6 TABLET | Refills: 0 | Status: SHIPPED | OUTPATIENT
Start: 2020-07-13 | End: 2020-08-05

## 2020-07-13 NOTE — PROGRESS NOTES
PIP= vaginal swab shows yeast ( which we have treated at her visit). She also has ureaplasma and an Rx for zithromax called in.

## 2020-08-05 ENCOUNTER — OFFICE VISIT (OUTPATIENT)
Dept: OBSTETRICS AND GYNECOLOGY | Facility: CLINIC | Age: 55
End: 2020-08-05

## 2020-08-05 VITALS
SYSTOLIC BLOOD PRESSURE: 124 MMHG | DIASTOLIC BLOOD PRESSURE: 82 MMHG | HEIGHT: 66 IN | BODY MASS INDEX: 28.77 KG/M2 | WEIGHT: 179 LBS

## 2020-08-05 DIAGNOSIS — Z01.419 PAP SMEAR, LOW-RISK: Primary | ICD-10-CM

## 2020-08-05 DIAGNOSIS — Z87.42 HISTORY OF VAGINITIS: ICD-10-CM

## 2020-08-05 DIAGNOSIS — Z11.51 SPECIAL SCREENING EXAMINATION FOR HUMAN PAPILLOMAVIRUS (HPV): ICD-10-CM

## 2020-08-05 DIAGNOSIS — D05.11 BREAST NEOPLASM, TIS (DCIS), RIGHT: ICD-10-CM

## 2020-08-05 DIAGNOSIS — Z01.419 ROUTINE GYNECOLOGICAL EXAMINATION: ICD-10-CM

## 2020-08-05 PROBLEM — N76.0 ACUTE VAGINITIS: Status: RESOLVED | Noted: 2019-07-15 | Resolved: 2020-08-05

## 2020-08-05 PROCEDURE — 99396 PREV VISIT EST AGE 40-64: CPT | Performed by: OBSTETRICS & GYNECOLOGY
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
## 2020-08-10 LAB
CYTOLOGIST CVX/VAG CYTO: NORMAL
CYTOLOGY CVX/VAG DOC CYTO: NORMAL
CYTOLOGY CVX/VAG DOC THIN PREP: NORMAL
DX ICD CODE: NORMAL
HIV 1 & 2 AB SER-IMP: NORMAL
HPV I/H RISK 1 DNA CVX QL PROBE+SIG AMP: NEGATIVE
OTHER STN SPEC: NORMAL
PATHOLOGIST CVX/VAG CYTO: NORMAL
STAT OF ADQ CVX/VAG CYTO-IMP: NORMAL

## 2020-09-11 ENCOUNTER — TELEPHONE (OUTPATIENT)
Dept: SURGERY | Facility: CLINIC | Age: 55
End: 2020-09-11

## 2020-09-11 DIAGNOSIS — N60.91 BENIGN MAMMARY DYSPLASIA OF RIGHT BREAST: Primary | ICD-10-CM

## 2020-09-14 ENCOUNTER — TELEPHONE (OUTPATIENT)
Dept: SURGERY | Facility: CLINIC | Age: 55
End: 2020-09-14

## 2020-09-14 ENCOUNTER — APPOINTMENT (OUTPATIENT)
Dept: MRI IMAGING | Facility: HOSPITAL | Age: 55
End: 2020-09-14

## 2020-09-14 ENCOUNTER — HOSPITAL ENCOUNTER (OUTPATIENT)
Dept: MAMMOGRAPHY | Facility: HOSPITAL | Age: 55
Discharge: HOME | End: 2020-09-14
Admitting: SURGERY

## 2020-09-14 DIAGNOSIS — N60.91 ATYPICAL DUCTAL HYPERPLASIA OF RIGHT BREAST: ICD-10-CM

## 2020-09-14 DIAGNOSIS — Z80.3 FH: BREAST CANCER: ICD-10-CM

## 2020-09-14 PROCEDURE — 77067 SCR MAMMO BI INCL CAD: CPT

## 2020-09-14 PROCEDURE — 77063 BREAST TOMOSYNTHESIS BI: CPT

## 2020-09-14 NOTE — TELEPHONE ENCOUNTER
Pt called, said consuelo changed her MRI to 9-29-20 we moved her appt with Dr KING  to .11-6-20 arrive 11:45    Brenda

## 2020-09-15 ENCOUNTER — TELEPHONE (OUTPATIENT)
Dept: SURGERY | Facility: CLINIC | Age: 55
End: 2020-09-15

## 2020-09-15 NOTE — TELEPHONE ENCOUNTER
Bilateral screening mammogram with tomosynthesis September 14, 2020 [facility] the breasts are heterogenous Addison dense.  Benign appearing postsurgical change right breast BI-RADS 2    [facility] bilateral breast MRI October 15, 2020: No MRI evidence of malignancy in either breast.  Recommend annual bilateral screening in September 2021 and breast MRI in 1 year BI-RADS 2

## 2020-09-29 ENCOUNTER — APPOINTMENT (OUTPATIENT)
Dept: MRI IMAGING | Facility: HOSPITAL | Age: 55
End: 2020-09-29

## 2020-10-15 ENCOUNTER — HOSPITAL ENCOUNTER (OUTPATIENT)
Dept: MRI IMAGING | Facility: HOSPITAL | Age: 55
Discharge: HOME | End: 2020-10-15
Admitting: SURGERY

## 2020-10-15 LAB — CREAT BLDA-MCNC: 0.6 MG/DL (ref 0.6–1.3)

## 2020-10-15 PROCEDURE — A9577 INJ MULTIHANCE: HCPCS | Performed by: SURGERY

## 2020-10-15 PROCEDURE — 77049 MRI BREAST C-+ W/CAD BI: CPT

## 2020-10-15 PROCEDURE — 0 GADOBENATE DIMEGLUMINE 529 MG/ML SOLUTION: Performed by: SURGERY

## 2020-10-15 PROCEDURE — 82565 ASSAY OF CREATININE: CPT

## 2020-10-15 RX ADMIN — GADOBENATE DIMEGLUMINE 16 ML: 529 INJECTION, SOLUTION INTRAVENOUS at 16:45

## 2020-11-03 ENCOUNTER — TELEPHONE (OUTPATIENT)
Dept: SURGERY | Facility: CLINIC | Age: 55
End: 2020-11-03

## 2020-11-03 ENCOUNTER — OFFICE VISIT (OUTPATIENT)
Dept: SURGERY | Facility: CLINIC | Age: 55
End: 2020-11-03

## 2020-11-03 ENCOUNTER — TELEPHONE (OUTPATIENT)
Dept: ONCOLOGY | Facility: CLINIC | Age: 55
End: 2020-11-03

## 2020-11-03 VITALS
OXYGEN SATURATION: 99 % | HEIGHT: 66 IN | TEMPERATURE: 98.4 F | WEIGHT: 174 LBS | BODY MASS INDEX: 27.97 KG/M2 | DIASTOLIC BLOOD PRESSURE: 84 MMHG | HEART RATE: 80 BPM | SYSTOLIC BLOOD PRESSURE: 127 MMHG

## 2020-11-03 DIAGNOSIS — Z80.3 FH: BREAST CANCER: ICD-10-CM

## 2020-11-03 DIAGNOSIS — R92.1 BREAST CALCIFICATIONS ON MAMMOGRAM: ICD-10-CM

## 2020-11-03 DIAGNOSIS — N60.91 ATYPICAL DUCTAL HYPERPLASIA OF RIGHT BREAST: Primary | ICD-10-CM

## 2020-11-03 PROCEDURE — 99213 OFFICE O/P EST LOW 20 MIN: CPT | Performed by: SURGERY

## 2020-11-03 RX ORDER — TAMOXIFEN CITRATE 10 MG/1
10 TABLET ORAL DAILY
Qty: 30 TABLET | Refills: 0 | Status: CANCELLED | OUTPATIENT
Start: 2020-11-03

## 2020-11-03 NOTE — TELEPHONE ENCOUNTER
Message to [facility], patient needs follow up appt scheduled with Dr. Singh.     Spoke with Blanche [facility] Scheduling, she agreed to help us get her follow up scheduled with Dr. Singh.

## 2020-11-03 NOTE — PROGRESS NOTES
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 16, 2021.  We will arrange this and she will see our nurse practitioner Leslie Mcdonough thereafter.  I asked her to continue her self breast exam and to call us in the interim with any concerns would be happy to see her back sooner.      I previously did a risk assessment based on at minimum the atypical hyperplasia, her S[employer] decent and her family history and this returned a likelihood for developing breast cancer over lifetime of 48%.    Her lifetime risk for developing breast cancer is 48%, assuming that this is ADH rather than DCIS.    Roma Hernandez MD        Next Appointment:  Return for Next scheduled follow up, after imaging, with Leslie Mcdonough.  20 minute office visit, 15 in face to face .    EMR Dragon/transcription disclaimer:    Much of this encounter note is an electronic transcription/translocation of spoken language to printed text.  The electronic translation of spoken language may permit erroneous, or at times, nonsensical words or phrases to be inadvertently transcribed.  Although I have reviewed the note from such areas, some may still exist.

## 2020-11-05 ENCOUNTER — TELEPHONE (OUTPATIENT)
Dept: ONCOLOGY | Facility: CLINIC | Age: 55
End: 2020-11-05

## 2020-11-05 NOTE — TELEPHONE ENCOUNTER
DR. CARO'S OFFICE CALLED TO NOTIFY THE PATIENT HAS NOT FOLLOWED UP WITH DR. VALADEZ SINCE 2019.    PT CAN BE REACHED AT:  [phone]

## 2020-11-05 NOTE — TELEPHONE ENCOUNTER
FROM DR. CARO'S OFFICE THAT PT HAS NOT FU WITH DR. VALADEZ SINCE 2019 - CALLED AND LEFT MSG ON PT VM TO CALL FOR AN APPT

## 2020-11-12 ENCOUNTER — TELEPHONE (OUTPATIENT)
Dept: ONCOLOGY | Facility: CLINIC | Age: 55
End: 2020-11-12

## 2020-11-12 NOTE — TELEPHONE ENCOUNTER
----- Message from Maria Fernanda George RN sent at 11/3/2020 12:03 PM EST -----  Regarding: FW: follow up  Pt had some questions about her f/u apt. See below.   ----- Message -----  From: Naty Arce  Sent: 11/3/2020  11:36 AM EST  To: k [facility]  Subject: FW: follow up                                    CALLED PT TO SCHEDULE APPT AND STATED THAT SHE NEVER RECEIVED PRESCRIPTION FOR TAMOXIFEN AND IF THIS CAN JUST BE CALLED IN DOESN'T KNOW IF SHE REALLY NEEDS TO COME IN AND SEE DR. SINGH???   PLEASE CALL PT (DID NOT SCHEDULE HER AN APPT)      ----- Message -----  From: Rebecca Chirinos MA  Sent: 11/3/2020  11:09 AM EST  To: [facility]  Subject: follow up                                        Good morning, we saw mutual patient in office today  Dr. Hernandez let us know she got lost in follow up with Dr. Singh and needs a follow up appt made with Dr. Singh.      Could you please help us get this scheduled?     Thank you, rebecca

## 2020-11-12 NOTE — TELEPHONE ENCOUNTER
Have attempted to call pt several times re: the messages below and have left her a voice mail with my direct number to return my call twice. I have not heard back from patient. Closing this encounter for now and hopefully pt will call back for her needed follow up.

## 2020-11-25 ENCOUNTER — TELEPHONE (OUTPATIENT)
Dept: SURGERY | Facility: CLINIC | Age: 55
End: 2020-11-25

## 2020-11-25 RX ORDER — TAMOXIFEN CITRATE 10 MG/1
10 TABLET ORAL DAILY
Qty: 30 TABLET | Refills: 2 | Status: SHIPPED | OUTPATIENT
Start: 2020-11-25 | End: 2020-12-28 | Stop reason: SDUPTHER

## 2020-11-25 NOTE — TELEPHONE ENCOUNTER
Pt called back re: starting tamoxifen and scheduling a follow up appointment with Dr. Singh. Per Dr. Singh's last note I have routed a prescription for Tamoxifen 10mg daily. Message routed to scheduling to call pt with appointment in February. Pt v/u. Will mail pt medication information on tamoxifen as well as list of medications to avoid while taking tamoxifen.

## 2020-11-25 NOTE — TELEPHONE ENCOUNTER
Left patient a message to call us back about Medical Oncologist appt. Has this been scheduled yet?

## 2020-12-03 ENCOUNTER — TELEPHONE (OUTPATIENT)
Dept: SURGERY | Facility: CLINIC | Age: 55
End: 2020-12-03

## 2020-12-03 NOTE — TELEPHONE ENCOUNTER
Scheduled Bilateral 3D Screen Mammo/Breast Mri at [facility]  10-18-21 @ 11:00    Return to see RISSA Mcdonough 11-25-21 arrive 11:15    LM on patient's phone    Brenda

## 2020-12-28 RX ORDER — TAMOXIFEN CITRATE 10 MG/1
10 TABLET ORAL DAILY
Qty: 30 TABLET | Refills: 2 | Status: SHIPPED | OUTPATIENT
Start: 2020-12-28 | End: 2021-04-15

## 2020-12-28 NOTE — TELEPHONE ENCOUNTER
CALLER: PT    Relationship: SELF    Best call back number: [phone]    Medication needed:   tamoxifen (NOLVADEX) 10 MG      When do you need the refill by: 2 PILLS LEFT    What details did the patient provide when requesting the medication: NONE    Does the patient have less than a 3 day supply:  [x] Yes  [] No    What is the patient's preferred pharmacy:    [facility]   [address] [place] [id number] [phone]

## 2021-03-01 ENCOUNTER — TELEPHONE (OUTPATIENT)
Dept: ONCOLOGY | Facility: HOSPITAL | Age: 56
End: 2021-03-01

## 2021-03-01 ENCOUNTER — TELEPHONE (OUTPATIENT)
Dept: ONCOLOGY | Facility: CLINIC | Age: 56
End: 2021-03-01

## 2021-03-01 NOTE — TELEPHONE ENCOUNTER
Called pt (regarding telephone encounter from earlier) to let her know that Dr. Singh said to stop tamoxifen for 2 months then restart it at 5 mg and Jyoti will call her in 2 months to check in on her to see how she is doing. Pt v/u.

## 2021-03-01 NOTE — TELEPHONE ENCOUNTER
Returning call to pt. Pt stated she has been on tamoxifen for 60 days and is having bad side effects. Pt stated she is having severe hot flashes, about 6 throughout the night where she wakes up sweating and is also having terrible vaginal dryness that is causing pain. Pt stated she is seeing her ob/gyn on Wednesday, but she does not think the tamoxifen was going to work for her. Pt also wanted to let us know she was diagnosed with diabetes about a month ago and she is doing well, she's lost weight, and her sugars are well controlled. Pt stated she is taking Ozempic for her diabetes which is a once weekly injection and she wasn't sure if that interacted with tamoxifen. Told pt I would pass this information along to Dr. Singh's nurse to see if she wanted to make any changes. Pt v/u.

## 2021-03-03 ENCOUNTER — OFFICE VISIT (OUTPATIENT)
Dept: OBSTETRICS AND GYNECOLOGY | Facility: CLINIC | Age: 56
End: 2021-03-03

## 2021-03-03 VITALS
WEIGHT: 166.8 LBS | HEIGHT: 66 IN | BODY MASS INDEX: 26.81 KG/M2 | DIASTOLIC BLOOD PRESSURE: 80 MMHG | SYSTOLIC BLOOD PRESSURE: 122 MMHG

## 2021-03-03 DIAGNOSIS — N94.10 FEMALE DYSPAREUNIA: ICD-10-CM

## 2021-03-03 DIAGNOSIS — Z13.9 SCREENING FOR CONDITION: ICD-10-CM

## 2021-03-03 DIAGNOSIS — R10.2 VAGINAL PAIN: Primary | ICD-10-CM

## 2021-03-03 DIAGNOSIS — N89.8 VAGINAL IRRITATION: ICD-10-CM

## 2021-03-03 LAB
BILIRUB BLD-MCNC: NEGATIVE MG/DL
CLARITY, POC: CLEAR
COLOR UR: YELLOW
GLUCOSE UR STRIP-MCNC: NEGATIVE MG/DL
KETONES UR QL: NEGATIVE
LEUKOCYTE EST, POC: NEGATIVE
NITRITE UR-MCNC: NEGATIVE MG/ML
PH UR: 6 [PH] (ref 5–8)
PROT UR STRIP-MCNC: NEGATIVE MG/DL
RBC # UR STRIP: NEGATIVE /UL
SP GR UR: 1.01 (ref 1–1.03)
UROBILINOGEN UR QL: NORMAL

## 2021-03-03 PROCEDURE — 99214 OFFICE O/P EST MOD 30 MIN: CPT | Performed by: OBSTETRICS & GYNECOLOGY

## 2021-03-03 PROCEDURE — 81002 URINALYSIS NONAUTO W/O SCOPE: CPT | Performed by: OBSTETRICS & GYNECOLOGY

## 2021-03-03 NOTE — PROGRESS NOTES
"      Mateusz Jorgensen is a 55 y.o. patient who presents for follow up of   Chief Complaint   Patient presents with   • Vaginal Pain     Vaginall dryness, with pain all the time,      55-year-old established patient here for emergency appointment for vaginal pain and dryness.  She has a history of right breast cancer and was placed on tamoxifen several months ago.  She has had horrific hot flashes and night sweats.  About 2 weeks ago she started noticing severe vaginal dryness and irritation.  She not had any significant itching or discharge.  She is not sure if this is a side effect of the tamoxifen or not.  She did speak with her oncologist Dr. Singh yesterday and she stopped tamoxifen 2 days ago.  She has had some relief already.  She denies any postmenopausal vaginal bleeding.        The following portions of the patient's history were reviewed and updated as appropriate: allergies, current medications and problem list.    Review of Systems   Constitutional: Positive for activity change, fatigue and unexpected weight change.   Endocrine: Positive for heat intolerance.   Genitourinary: Positive for dyspareunia, vaginal discharge and vaginal pain. Negative for vaginal bleeding.   Psychiatric/Behavioral: Positive for sleep disturbance.   All other systems reviewed and are negative.      /80   Ht 167.6 cm (65.98\")   Wt 75.7 kg (166 lb 12.8 oz)   Breastfeeding No   BMI 26.94 kg/m²     Physical Exam  Vitals signs and nursing note reviewed. Exam conducted with a chaperone present.   Constitutional:       Appearance: Normal appearance. She is well-developed and normal weight.   HENT:      Head: Normocephalic and atraumatic.   Eyes:      General: No scleral icterus.     Conjunctiva/sclera: Conjunctivae normal.   Neck:      Thyroid: No thyromegaly.   Abdominal:      General: There is no distension.      Palpations: Abdomen is soft. There is no mass.      Tenderness: There is no abdominal tenderness. There is no " guarding or rebound.      Hernia: No hernia is present.   Genitourinary:     Labia:         Right: No rash, tenderness, lesion or injury.         Left: No rash, tenderness, lesion or injury.       Vagina: Normal. No tenderness.      Cervix: Normal.      Uterus: Normal.       Adnexa: Right adnexa normal and left adnexa normal.      Comments: Moderate atrophy noted  Skin:     General: Skin is warm and dry.   Neurological:      Mental Status: She is alert and oriented to person, place, and time.   Psychiatric:         Mood and Affect: Mood normal.         Behavior: Behavior normal.         Thought Content: Thought content normal.         Judgment: Judgment normal.         A/P:  1. Vaginal pain -  . Suspect that this is related to E2 blockade from tamoxifen. She plans on taking a 60 day break from Tamoxifen to see if her symptoms improve.  Recommend that she not wear underwear at night, avoid washing the vulva with anything but water and use Replens over-the-counter daily for discomfort.  We also discussed that she can use Aquaphor if Replens is not sufficient.  She is not a candidate for estrogen use due to her history of breast cancer.  2. Dyspareunia- no LPS appreciated on exam today .  Encourage use of lubrication such as Jay glide or Millennium  3. Vaginal irritation- check NuSwab Y/M/L/B. Pt has had better control of her diabetes and so is not having as many yeast infections.  4. RHM- UTD annual 8/2020.   5. RTO 1 month recheck symptoms.     Assessment/Plan   Diagnoses and all orders for this visit:    1. Vaginal pain (Primary)  -     NuSwab VG+ - Swab, Vagina  -     Genital Mycoplasmas HILDA, Swab - Swab, Vagina    2. Screening for condition  -     POC Urinalysis Dipstick    3. Vaginal irritation    4. Female dyspareunia                 No follow-ups on file.      Lula Min MD    3/3/2021  12:34 EST

## 2021-03-07 LAB
A VAGINAE DNA VAG QL NAA+PROBE: ABNORMAL SCORE
BVAB2 DNA VAG QL NAA+PROBE: ABNORMAL SCORE
C ALBICANS DNA VAG QL NAA+PROBE: POSITIVE
C GLABRATA DNA VAG QL NAA+PROBE: NEGATIVE
C TRACH DNA VAG QL NAA+PROBE: NEGATIVE
M GENITALIUM DNA SPEC QL NAA+PROBE: NEGATIVE
M HOMINIS DNA SPEC QL NAA+PROBE: NEGATIVE
MEGA1 DNA VAG QL NAA+PROBE: ABNORMAL SCORE
N GONORRHOEA DNA VAG QL NAA+PROBE: NEGATIVE
T VAGINALIS DNA VAG QL NAA+PROBE: NEGATIVE
UREAPLASMA DNA SPEC QL NAA+PROBE: POSITIVE

## 2021-03-08 RX ORDER — DOXYCYCLINE HYCLATE 100 MG
100 TABLET ORAL 2 TIMES DAILY
Qty: 14 TABLET | Refills: 0 | Status: SHIPPED | OUTPATIENT
Start: 2021-03-08 | End: 2021-03-15

## 2021-03-08 RX ORDER — FLUCONAZOLE 150 MG/1
150 TABLET ORAL ONCE
Qty: 1 TABLET | Refills: 1 | Status: SHIPPED | OUTPATIENT
Start: 2021-03-08 | End: 2021-03-08

## 2021-03-30 ENCOUNTER — BULK ORDERING (OUTPATIENT)
Dept: CASE MANAGEMENT | Facility: OTHER | Age: 56
End: 2021-03-30

## 2021-03-30 DIAGNOSIS — Z23 IMMUNIZATION DUE: ICD-10-CM

## 2021-04-15 ENCOUNTER — OFFICE VISIT (OUTPATIENT)
Dept: OBSTETRICS AND GYNECOLOGY | Facility: CLINIC | Age: 56
End: 2021-04-15

## 2021-04-15 VITALS
DIASTOLIC BLOOD PRESSURE: 86 MMHG | SYSTOLIC BLOOD PRESSURE: 138 MMHG | BODY MASS INDEX: 27.42 KG/M2 | HEIGHT: 66 IN | WEIGHT: 170.6 LBS

## 2021-04-15 DIAGNOSIS — B37.31 YEAST VAGINITIS: Primary | ICD-10-CM

## 2021-04-15 DIAGNOSIS — Z13.9 SCREENING FOR UNSPECIFIED CONDITION: ICD-10-CM

## 2021-04-15 LAB
BILIRUB BLD-MCNC: NEGATIVE MG/DL
CLARITY, POC: CLEAR
COLOR UR: YELLOW
GLUCOSE UR STRIP-MCNC: NEGATIVE MG/DL
KETONES UR QL: NEGATIVE
LEUKOCYTE EST, POC: NEGATIVE
NITRITE UR-MCNC: NEGATIVE MG/ML
PH UR: 5 [PH] (ref 5–8)
PROT UR STRIP-MCNC: NEGATIVE MG/DL
RBC # UR STRIP: ABNORMAL /UL
SP GR UR: 1.03 (ref 1–1.03)
UROBILINOGEN UR QL: NORMAL

## 2021-04-15 PROCEDURE — 81002 URINALYSIS NONAUTO W/O SCOPE: CPT | Performed by: OBSTETRICS & GYNECOLOGY

## 2021-04-15 PROCEDURE — 99213 OFFICE O/P EST LOW 20 MIN: CPT | Performed by: OBSTETRICS & GYNECOLOGY

## 2021-04-15 RX ORDER — FLUCONAZOLE 150 MG/1
150 TABLET ORAL ONCE
Qty: 1 TABLET | Refills: 1 | Status: SHIPPED | OUTPATIENT
Start: 2021-04-15 | End: 2021-04-15

## 2021-04-15 RX ORDER — ROSUVASTATIN CALCIUM 5 MG/1
5 TABLET, COATED ORAL DAILY
COMMUNITY
Start: 2021-04-07

## 2021-04-15 NOTE — PROGRESS NOTES
"Mateusz Jorgensen is a 55 y.o. patient who presents for follow up of   Chief Complaint   Patient presents with   • Vaginitis       HPI 55-year-old new patient to me with a new problem of vaginitis. She had a history of DCIS of the right breast. She took a short course of tamoxifen for chemoprevention and had a severe reaction of the vulvar and vaginal skin to the antiestrogen. She has pain with intercourse. She has now developed a discharge on top of that. She describes the discharge as white and irritating. There is no odor and no fever and no pain in her abdomen. She is diabetic.    The following portions of the patient's history were reviewed and updated as appropriate: allergies, current medications and problem list.    Review of Systems   Constitutional: Negative for appetite change, fever and unexpected weight change.   HENT: Negative for congestion and sore throat.    Respiratory: Negative for cough and shortness of breath.    Cardiovascular: Negative for chest pain and palpitations.   Gastrointestinal: Negative for abdominal distention, abdominal pain, constipation, diarrhea, nausea and vomiting.   Endocrine: Negative.    Genitourinary: Positive for vaginal discharge and vaginal pain. Negative for dyspareunia, menstrual problem and pelvic pain.   Skin: Negative.    Neurological: Negative for dizziness and syncope.   Hematological: Negative.    Psychiatric/Behavioral: Negative for dysphoric mood and sleep disturbance. The patient is not nervous/anxious.        /86   Ht 167.6 cm (66\")   Wt 77.4 kg (170 lb 9.6 oz)   BMI 27.54 kg/m²     Physical Exam  Vitals and nursing note reviewed. Exam conducted with a chaperone present.   Constitutional:       Appearance: She is well-developed.   HENT:      Head: Normocephalic and atraumatic.   Pulmonary:      Effort: Pulmonary effort is normal. No respiratory distress.   Abdominal:      General: There is no distension.      Palpations: Abdomen is soft. There " is no mass.      Tenderness: There is no abdominal tenderness. There is no guarding or rebound.   Genitourinary:     General: Normal vulva.      Exam position: Supine.      Vagina: Vaginal discharge (yeast), erythema and tenderness present. No bleeding.      Cervix: Normal.   Musculoskeletal:         General: Normal range of motion.   Skin:     General: Skin is warm and dry.   Neurological:      Mental Status: She is alert and oriented to person, place, and time.   Psychiatric:         Behavior: Behavior normal.         Thought Content: Thought content normal.         Judgment: Judgment normal.           Assessment/Plan    Diagnoses and all orders for this visit:    1. Yeast vaginitis (Primary)    2. Screening for unspecified condition  -     POC Urinalysis Dipstick    Other orders  -     fluconazole (DIFLUCAN) 150 MG tablet; Take 1 tablet by mouth 1 (One) Time for 1 dose.  Dispense: 1 tablet; Refill: 1    Yeast vaginitis. Treat with Diflucan.    No follow-ups on file.      Amado Connelly MD  4/15/2021  15:44 EDT

## 2021-10-18 ENCOUNTER — HOSPITAL ENCOUNTER (OUTPATIENT)
Dept: MAMMOGRAPHY | Facility: HOSPITAL | Age: 56
Discharge: HOME | End: 2021-10-18
Admitting: SURGERY

## 2021-10-18 ENCOUNTER — HOSPITAL ENCOUNTER (OUTPATIENT)
Dept: MRI IMAGING | Facility: HOSPITAL | Age: 56
Discharge: HOME | End: 2021-10-18
Admitting: SURGERY

## 2021-10-18 DIAGNOSIS — N60.91 ATYPICAL DUCTAL HYPERPLASIA OF RIGHT BREAST: ICD-10-CM

## 2021-10-18 DIAGNOSIS — Z80.3 FH: BREAST CANCER: ICD-10-CM

## 2021-10-18 LAB — CREAT BLDA-MCNC: 0.7 MG/DL (ref 0.6–1.3)

## 2021-10-18 PROCEDURE — 0 GADOBENATE DIMEGLUMINE 529 MG/ML SOLUTION: Performed by: SURGERY

## 2021-10-18 PROCEDURE — 82565 ASSAY OF CREATININE: CPT

## 2021-10-18 PROCEDURE — A9577 INJ MULTIHANCE: HCPCS | Performed by: SURGERY

## 2021-10-18 PROCEDURE — 77067 SCR MAMMO BI INCL CAD: CPT

## 2021-10-18 PROCEDURE — 77063 BREAST TOMOSYNTHESIS BI: CPT

## 2021-10-18 PROCEDURE — 77049 MRI BREAST C-+ W/CAD BI: CPT

## 2021-10-18 RX ADMIN — GADOBENATE DIMEGLUMINE 15 ML: 529 INJECTION, SOLUTION INTRAVENOUS at 12:29

## 2021-10-25 ENCOUNTER — OFFICE VISIT (OUTPATIENT)
Dept: SURGERY | Facility: CLINIC | Age: 56
End: 2021-10-25

## 2021-10-25 VITALS
BODY MASS INDEX: 27.32 KG/M2 | SYSTOLIC BLOOD PRESSURE: 110 MMHG | DIASTOLIC BLOOD PRESSURE: 72 MMHG | HEART RATE: 68 BPM | HEIGHT: 66 IN | WEIGHT: 170 LBS | OXYGEN SATURATION: 98 %

## 2021-10-25 DIAGNOSIS — D05.11 BREAST NEOPLASM, TIS (DCIS), RIGHT: Primary | ICD-10-CM

## 2021-10-25 DIAGNOSIS — Z80.3 FH: BREAST CANCER: ICD-10-CM

## 2021-10-25 DIAGNOSIS — Z91.89 INCREASED RISK OF BREAST CANCER: ICD-10-CM

## 2021-10-25 PROCEDURE — 99214 OFFICE O/P EST MOD 30 MIN: CPT | Performed by: NURSE PRACTITIONER
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
## 2022-04-26 ENCOUNTER — TELEPHONE (OUTPATIENT)
Dept: SURGERY | Facility: CLINIC | Age: 57
End: 2022-04-26

## 2022-04-26 ENCOUNTER — OFFICE VISIT (OUTPATIENT)
Dept: SURGERY | Facility: CLINIC | Age: 57
End: 2022-04-26

## 2022-04-26 VITALS
BODY MASS INDEX: 27.32 KG/M2 | WEIGHT: 170 LBS | SYSTOLIC BLOOD PRESSURE: 114 MMHG | DIASTOLIC BLOOD PRESSURE: 72 MMHG | HEIGHT: 66 IN

## 2022-04-26 DIAGNOSIS — Z91.89 INCREASED RISK OF BREAST CANCER: ICD-10-CM

## 2022-04-26 DIAGNOSIS — D05.11 BREAST NEOPLASM, TIS (DCIS), RIGHT: Primary | ICD-10-CM

## 2022-04-26 DIAGNOSIS — Z80.3 FH: BREAST CANCER: ICD-10-CM

## 2022-04-26 PROCEDURE — 99213 OFFICE O/P EST LOW 20 MIN: CPT | Performed by: NURSE PRACTITIONER
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
## 2022-06-16 DIAGNOSIS — M25.511 ACUTE PAIN OF RIGHT SHOULDER: Primary | ICD-10-CM
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
## 2022-06-24 ENCOUNTER — OFFICE VISIT (OUTPATIENT)
Dept: ORTHOPEDIC SURGERY | Facility: CLINIC | Age: 57
End: 2022-06-24

## 2022-06-24 VITALS — HEIGHT: 66 IN | TEMPERATURE: 97.2 F | BODY MASS INDEX: 28.14 KG/M2 | WEIGHT: 175.1 LBS

## 2022-06-24 DIAGNOSIS — M25.511 RIGHT SHOULDER PAIN, UNSPECIFIED CHRONICITY: Primary | ICD-10-CM

## 2022-06-24 DIAGNOSIS — M75.41 IMPINGEMENT SYNDROME OF RIGHT SHOULDER: ICD-10-CM

## 2022-06-24 PROCEDURE — 73030 X-RAY EXAM OF SHOULDER: CPT | Performed by: ORTHOPAEDIC SURGERY

## 2022-06-24 PROCEDURE — 20610 DRAIN/INJ JOINT/BURSA W/O US: CPT | Performed by: ORTHOPAEDIC SURGERY

## 2022-06-24 PROCEDURE — 99204 OFFICE O/P NEW MOD 45 MIN: CPT | Performed by: ORTHOPAEDIC SURGERY

## 2022-06-24 RX ORDER — MELOXICAM 15 MG/1
TABLET ORAL
Qty: 30 TABLET | Refills: 0 | Status: SHIPPED | OUTPATIENT
Start: 2022-06-24 | End: 2022-11-02

## 2022-06-24 RX ORDER — METHYLPREDNISOLONE ACETATE 80 MG/ML
80 INJECTION, SUSPENSION INTRA-ARTICULAR; INTRALESIONAL; INTRAMUSCULAR; SOFT TISSUE
Status: COMPLETED | OUTPATIENT
Start: 2022-06-24 | End: 2022-06-24

## 2022-06-24 RX ORDER — LIDOCAINE HYDROCHLORIDE 10 MG/ML
4 INJECTION, SOLUTION EPIDURAL; INFILTRATION; INTRACAUDAL; PERINEURAL
Status: COMPLETED | OUTPATIENT
Start: 2022-06-24 | End: 2022-06-24

## 2022-06-24 RX ADMIN — METHYLPREDNISOLONE ACETATE 80 MG: 80 INJECTION, SUSPENSION INTRA-ARTICULAR; INTRALESIONAL; INTRAMUSCULAR; SOFT TISSUE at 10:26

## 2022-06-24 RX ADMIN — LIDOCAINE HYDROCHLORIDE 4 ML: 10 INJECTION, SOLUTION EPIDURAL; INFILTRATION; INTRACAUDAL; PERINEURAL at 10:26

## 2022-07-13 ENCOUNTER — TREATMENT (OUTPATIENT)
Dept: PHYSICAL THERAPY | Facility: CLINIC | Age: 57
End: 2022-07-13

## 2022-07-13 DIAGNOSIS — M25.511 ACUTE PAIN OF RIGHT SHOULDER: Primary | ICD-10-CM

## 2022-07-13 DIAGNOSIS — M79.601 PAIN OF RIGHT UPPER EXTREMITY: ICD-10-CM

## 2022-07-13 DIAGNOSIS — G89.29 CHRONIC LEFT-SIDED LOW BACK PAIN WITHOUT SCIATICA: ICD-10-CM

## 2022-07-13 DIAGNOSIS — M54.50 CHRONIC LEFT-SIDED LOW BACK PAIN WITHOUT SCIATICA: ICD-10-CM

## 2022-07-13 DIAGNOSIS — M54.2 CERVICALGIA: ICD-10-CM

## 2022-07-13 PROCEDURE — 97112 NEUROMUSCULAR REEDUCATION: CPT | Performed by: PHYSICAL THERAPIST

## 2022-07-13 PROCEDURE — 97161 PT EVAL LOW COMPLEX 20 MIN: CPT | Performed by: PHYSICAL THERAPIST

## 2022-07-13 PROCEDURE — 97110 THERAPEUTIC EXERCISES: CPT | Performed by: PHYSICAL THERAPIST

## 2022-07-13 NOTE — PROGRESS NOTES
Physical Therapy Initial Evaluation and Plan of Care      Patient: Mateusz Jorgensen   : 1965  Diagnosis/ICD-10 Code:  Acute pain of right shoulder [M25.511]  Referring practitioner: Hayley Levi MD  Date of Initial Visit: 2022  Today's Date: 2022  Patient seen for 1 session       Visit Diagnoses:    ICD-10-CM ICD-9-CM   1. Acute pain of right shoulder  M25.511 719.41   2. Pain of right upper extremity  M79.601 729.5   3. Cervicalgia  M54.2 723.1   4. Chronic left-sided low back pain without sciatica  M54.50 724.2    G89.29 338.29         Subjective  Chief Complaint/Subjective Report: Patient presented to the clinic today with complaints of pain in the right shoulder and L LBP with sciatica that have both been worsening over the past few months. Pt has had a cortisone injection for the shoulder two weeks ago which has helped moderately, still has a lot of pain with certians motions. Pt has PMH of Breast CA and Type 2 DM. Patient reported no significant medical history today aside from that previously mentioned; no reports of CNS signs or symptoms, or indications of other sinister pathologies were given in the subjective history today.  Mechanism of Injury: Unknown  Functional Limitations: ADLs, work-related activities  Subjective Goals for PT: Return to PLOF, decreased pain with ADLs and community activities  Prior Treatment for Current Condition: HEP  Imaging:Radiographs - neg  Pain/VNRS (0-10/10): Worst: 8/10; Average: 3/10  Agg. Factors: lifting, reaching  Relieving Factors: Working out  Subjective Questionnaire: QuickDASH: 20  PLOF: Independent with all functional tasks, ADLs, and community activities  Occupation:   Social:   PMH: See history section of patient chart  Precautions/Contraindications: No reported contraindications from subjective history today unless otherwise stated above.      Objective  AROM (% of Full --- * denotes degrees in place of % --- + denotes tested to be  WFL)       -- Cervical Rotation Right:  85 Left:  90    -- Thoracic Rotation Right 80 Left 80    -- Cervical Flexion:   90      -- Cervical Extension:  85                   AROM Shoulder (% of Full --- * denotes degrees in place of % --- + denotes tested to be WFL)      -- Shoulder Flexion Right: 80 Left: +    -- Shoulder Abduction Right: 75 Left: +     -- Shoulder ER at Side Right: 90 Left: +  -- Shoulder IR Behind Back: 85 Left: +    UE MMT (0-5/5 --- + denotes WFL)  -- Shoulder Flexion Right: 4+/5 Left: +/5  -- Shoulder Abduction Right: 4+/5 Left: +/5  -- Shoulder ER at Side Right: 4+/5 Left: 5-/5  -- Shoulder IR Behind Back: -/5 Left: -/5  -- Elbow Flexion Right: -/5 Left: -/5  -- Elbow Extension Right  -/5 Left: --/5  -- Wrist Flexion Right:  -/5 Left: -/5  -- Wrist Extension Right: -/5 Left: -/5      Functional Assessment: Impaired tolerance to reaching and lifting tasks  + Radial Nerve TT   + Reduction in symptoms following cervical mobs     See Exercise, Manual, and Modality Logs for complete treatment.       Documentation of Assessment Details: Patient presented the clinic with signs and symptoms consistent with segmental dysfucntion of the shoulder and radiating referred pain from the cervical spine. Patient demonstrated limitations and impairments functional mobility and strength during today's evaluation, and will benefit from skilled PT address current limitations and impairments to help patient regain functional mobility and strength necessary to return to PLOF, reduce pain, and improve current symptoms as patient progresses towards meeting current goals established at therapy today. The patient present with no comorbidities or personal factors that impact my POC and deficit in above mentioned areas. Based on these findings and results from valid tests and measures, I am classifying this patient's presentation as stable with uncomplicated characteristics, and a good prognosis for recovery.      Assessment & Plan     Assessment  Impairments: abnormal gait, abnormal or restricted ROM, activity intolerance, impaired physical strength, lacks appropriate home exercise program and pain with function  Functional Limitations: carrying objects, lifting, sleeping, walking, uncomfortable because of pain, sitting and standingPrognosis details: Based on valid tests and measures performed today I am classifying this patient as presently stable with uncomplicated characteristics and good prognosis for recovery    Goals  Plan Goals: Pt will improve Subjective assessment by >75% within 6 weeks to demonstrate improvements in test and measure outcomes and overall functionality, and to show reduction of symptoms, improved activity tolerance, and ability to complete ADLs and work-related activities     Pt will improve functional mobility and pain free ROM to ranges that allow for pain free functional activities such as dressing, bathing, and completing ADLs within 8 weeks to demonstrate improvements in functional independence, mobility, and community participation to allow for a return to PLOF.    Pt will improve functional mobility and pain free ROM and functional strength to  allow for pain free functional activities such as safely squatting to retreive items from the floor and reaching overhead within 6 weeks to demonstrate improvements in functional independence, mobility, and community participation to allow for a return to PLOF.    Pt will demonstrate 80% full ROM for all measured ROMs within 8 weeks to demonstrate improvements in functional mobility and ability to complete ADLs and work-related activities Independently.    Pt will sleep through the night without waking d/t current symptoms >5/7 nights per week within 8 weeks to demonstrate improvements restful sleep, overall function, and symptom reduction    Pt will report pain <2/10 at worst with activity and at rest within 8 weeks to demonstrate improvements in  pain-free ROM and function to improve functional mobility, activities tolerance, and ability to complete ADLs and work-related activities    Pt will be able to lift and carry objects >30lbs without worsening of symptoms within 8 weeks to demonstrate improvements in functional mobility for ease of home and community tasks and improved functional independence.    Pt will be able to ambulate >60 mins independently without worsening of symptoms within 8 weeks to demonstrate improvements in functional mobility for ease of home and community ambulation and improved functional independence        Plan  Therapy options: will be seen for skilled therapy services  Planned modality interventions: dry needling, TENS, high voltage pulsed current (pain management), electrical stimulation/Russian stimulation and cryotherapy  Planned therapy interventions: ADL retraining, abdominal trunk stabilization, manual therapy, neuromuscular re-education, balance/weight-bearing training, body mechanics training, soft tissue mobilization, spinal/joint mobilization, joint mobilization, home exercise program, gait training, functional ROM exercises, strengthening, therapeutic activities and transfer training  Plan details: Duration: 2-3x/Wk for 4 Weeks - Upon completion of 4 weeks further evaluation and assessment with determine ongoing plan for continued care.    Continue with skilled physical therapy addressing previously mentioned limitations and impairments; progress HEP as tolerated; progress functional strengthening interventions to tolerance.        History # of Personal Factors and/or Comorbidities: LOW (0)  Examination of Body System(s): # of elements: LOW (1-2)  Clinical Presentation: STABLE   Clinical Decision Making: LOW       Timed:         Manual Therapy:    -     mins  68611;     Therapeutic Exercise:    15     mins  58866;     Neuromuscular Siva:    10    mins  45320;    Therapeutic Activity:     -     mins  80457;     Gait  Training:           mins  56052;     Ultrasound:          mins  09904;    Ionto                                  mins   10134  Self Care                           mins   33498  Canalith Repos         mins 55311    Un-Timed:  Electrical Stimulation:          mins  27014 ( );  Dry Needling         mins self-pay  Traction         mins 03179  Low Eval    20     Mins  35110  Mod Eval         Mins  31226  High Eval                            Mins  12525    Timed Treatment:   25   mins   Total Treatment:     45   mins      PT: Anthony Gonsalves PT     License Number: [id number]  Electronically signed by Anthony Gonsalves PT, 07/13/22, 3:20 PM EDT    Certification Period: 7/13/2022 thru 10/10/2022  I certify that the therapy services are furnished while this patient is under my care.  The services outlined above are required by this patient, and will be reviewed every 90 days.         Physician Signature:__________________________________________________    PHYSICIAN: Hayley Levi MD  NPI: [id number]                                      DATE:      Please sign and return via fax to [email or web address] . Thank you, [facility].

## 2022-07-18 ENCOUNTER — TREATMENT (OUTPATIENT)
Dept: PHYSICAL THERAPY | Facility: CLINIC | Age: 57
End: 2022-07-18

## 2022-07-18 DIAGNOSIS — M54.50 CHRONIC LEFT-SIDED LOW BACK PAIN WITHOUT SCIATICA: ICD-10-CM

## 2022-07-18 DIAGNOSIS — M25.511 ACUTE PAIN OF RIGHT SHOULDER: Primary | ICD-10-CM

## 2022-07-18 DIAGNOSIS — M54.2 CERVICALGIA: ICD-10-CM

## 2022-07-18 DIAGNOSIS — G89.29 CHRONIC LEFT-SIDED LOW BACK PAIN WITHOUT SCIATICA: ICD-10-CM

## 2022-07-18 DIAGNOSIS — M79.601 PAIN OF RIGHT UPPER EXTREMITY: ICD-10-CM

## 2022-07-18 PROCEDURE — 97140 MANUAL THERAPY 1/> REGIONS: CPT | Performed by: PHYSICAL THERAPIST

## 2022-07-18 PROCEDURE — 97530 THERAPEUTIC ACTIVITIES: CPT | Performed by: PHYSICAL THERAPIST

## 2022-07-18 PROCEDURE — 97112 NEUROMUSCULAR REEDUCATION: CPT | Performed by: PHYSICAL THERAPIST

## 2022-07-18 PROCEDURE — 97110 THERAPEUTIC EXERCISES: CPT | Performed by: PHYSICAL THERAPIST

## 2022-07-18 NOTE — PROGRESS NOTES
Physical Therapy Daily Progress Note    Patient: Mateusz Jorgensen   : 1965  Diagnosis/ICD-10 Code:  Acute pain of right shoulder [M25.511]  Referring practitioner: Hayley Levi MD  Date of Initial Visit: Type: THERAPY  Noted: 2022  Today's Date: 2022  Patient seen for 2 session       Visit Diagnoses:    ICD-10-CM ICD-9-CM   1. Acute pain of right shoulder  M25.511 719.41   2. Pain of right upper extremity  M79.601 729.5   3. Cervicalgia  M54.2 723.1   4. Chronic left-sided low back pain without sciatica  M54.50 724.2    G89.29 338.29            Subjective  Mateusz Jorgensen reported today that she's doing well, low back been more bothersome lately    Objective   No functional measures updated at today's visit unless otherwise stated. For functional assessment and documentation of patient progressions referred to the assessment section.    See Exercise, Manual, and Modality Logs for complete treatments.       Assessment/Plan  Tx today continued with emphasis of soft tissue manual therapy and progressive functional strengthening to address current limitations and impairments in mobility and function strength/stability of involved areas. Pt appears to be progressing well with current treatment plan; appears to be progressing well with functional strength and mobility, per gross assessment and patient report. Pt continues to have limitations in the above mentioned areas, but is progressing well towards current goals and will continue to benefit from skilled PT to help pt regain functional mobility and strength necessary to reduce symptoms and return to PLOF.      Continue with current treatment plan and ongoing assessment; progress interventions to tolerance         Timed:         Manual Therapy:    15     mins  84441;     Therapeutic Exercise:    25     mins  16878;     Neuromuscular Siva:    10    mins  44170;    Therapeutic Activity:     10     mins  23239;     Gait Training:           mins   66794;     Ultrasound:          mins  75353;    Ionto                                   mins   70517  Self Care                            mins   62729  Canalith Repos         mins 90084    Un-Timed:  Electrical Stimulation:         mins  16998 ( );  Dry Needling     -     mins self-pay  Traction          mins 89657  Low Eval          Mins  60359  Mod Eval          Mins  26181  High Eval                            Mins  42319      Timed Treatment:   60   mins   Total Treatment:     60   mins      PT: Anthony Gonsalves PT     License Number: [id number]  Electronically signed by Anthony Gonsalves PT, 07/18/22, 5:30 PM EDT

## 2022-07-22 ENCOUNTER — TREATMENT (OUTPATIENT)
Dept: PHYSICAL THERAPY | Facility: CLINIC | Age: 57
End: 2022-07-22

## 2022-07-22 DIAGNOSIS — G89.29 CHRONIC LEFT-SIDED LOW BACK PAIN WITHOUT SCIATICA: ICD-10-CM

## 2022-07-22 DIAGNOSIS — M54.50 CHRONIC LEFT-SIDED LOW BACK PAIN WITHOUT SCIATICA: ICD-10-CM

## 2022-07-22 DIAGNOSIS — M79.601 PAIN OF RIGHT UPPER EXTREMITY: ICD-10-CM

## 2022-07-22 DIAGNOSIS — M54.2 CERVICALGIA: ICD-10-CM

## 2022-07-22 DIAGNOSIS — M25.511 ACUTE PAIN OF RIGHT SHOULDER: Primary | ICD-10-CM

## 2022-07-22 PROCEDURE — 97530 THERAPEUTIC ACTIVITIES: CPT | Performed by: PHYSICAL THERAPIST

## 2022-07-22 PROCEDURE — 97110 THERAPEUTIC EXERCISES: CPT | Performed by: PHYSICAL THERAPIST

## 2022-07-22 PROCEDURE — 97140 MANUAL THERAPY 1/> REGIONS: CPT | Performed by: PHYSICAL THERAPIST

## 2022-07-22 PROCEDURE — 97112 NEUROMUSCULAR REEDUCATION: CPT | Performed by: PHYSICAL THERAPIST

## 2022-07-25 NOTE — PROGRESS NOTES
Physical Therapy Daily Progress Note    Patient: Mateusz Jorgensen   : 1965  Diagnosis/ICD-10 Code:  Acute pain of right shoulder [M25.511]  Referring practitioner: Hayley Levi MD  Date of Initial Visit: Type: THERAPY  Noted: 2022  Today's Date: 2022  Patient seen for 3 session       Visit Diagnoses:    ICD-10-CM ICD-9-CM   1. Acute pain of right shoulder  M25.511 719.41   2. Pain of right upper extremity  M79.601 729.5   3. Cervicalgia  M54.2 723.1   4. Chronic left-sided low back pain without sciatica  M54.50 724.2    G89.29 338.29            Subjective  Mateusz Jorgensen reported today that she's doing okay, feels like things are improving a bit, still having a lot of aching in the hip and tingling in the leg right now    Objective   No functional measures updated at today's visit unless otherwise stated. For functional assessment and documentation of patient progressions referred to the assessment section.    See Exercise, Manual, and Modality Logs for complete treatments.       Assessment/Plan  Tx today continued with emphasis of neurodynamic mobility, soft tissue MT, and progressive functional strengthening to address current limitations and impairments in mobility and function strength/stability of involved areas. Pt appears to be progressing well with current treatment plan; appears to be progressing well with functional strength and mobility, per gross assessment and patient report. Pt continues to have limitations in the above mentioned areas, but is progressing well towards current goals and will continue to benefit from skilled PT to help pt regain functional mobility and strength necessary to reduce symptoms and return to PLOF.      Continue with current treatment plan and ongoing assessment; progress interventions to tolerance         Timed:         Manual Therapy:    10     mins  69577;     Therapeutic Exercise:    20     mins  69908;     Neuromuscular Siva:    15    mins   08701;    Therapeutic Activity:     10     mins  90397;     Gait Training:           mins  52948;     Ultrasound:          mins  76141;    Ionto                                   mins   89446  Self Care                            mins   73348  Canalith Repos         mins 30691    Un-Timed:  Electrical Stimulation:         mins  94700 ( );  Dry Needling     -     mins self-pay  Traction          mins 96570  Low Eval          Mins  10858  Mod Eval          Mins  31298  High Eval                            Mins  03966      Timed Treatment:   55   mins   Total Treatment:     55   mins      PT: Anthony Gonsalves PT     License Number: [id number]  Electronically signed by Anthony Gonsalves PT, 07/25/22, 9:15 AM EDT

## 2022-10-19 ENCOUNTER — HOSPITAL ENCOUNTER (OUTPATIENT)
Dept: MAMMOGRAPHY | Facility: HOSPITAL | Age: 57
Discharge: HOME | End: 2022-10-19
Admitting: NURSE PRACTITIONER

## 2022-10-19 DIAGNOSIS — Z91.89 INCREASED RISK OF BREAST CANCER: ICD-10-CM

## 2022-10-19 DIAGNOSIS — Z80.3 FH: BREAST CANCER: ICD-10-CM

## 2022-10-19 DIAGNOSIS — D05.11 BREAST NEOPLASM, TIS (DCIS), RIGHT: ICD-10-CM

## 2022-10-19 PROCEDURE — 77063 BREAST TOMOSYNTHESIS BI: CPT

## 2022-10-19 PROCEDURE — 77067 SCR MAMMO BI INCL CAD: CPT

## 2022-11-02 ENCOUNTER — OFFICE VISIT (OUTPATIENT)
Dept: SURGERY | Facility: CLINIC | Age: 57
End: 2022-11-02

## 2022-11-02 ENCOUNTER — TELEPHONE (OUTPATIENT)
Dept: SURGERY | Facility: CLINIC | Age: 57
End: 2022-11-02

## 2022-11-02 VITALS
WEIGHT: 175 LBS | SYSTOLIC BLOOD PRESSURE: 126 MMHG | BODY MASS INDEX: 28.12 KG/M2 | DIASTOLIC BLOOD PRESSURE: 82 MMHG | HEIGHT: 66 IN

## 2022-11-02 DIAGNOSIS — Z80.3 FH: BREAST CANCER: ICD-10-CM

## 2022-11-02 DIAGNOSIS — D05.11 BREAST NEOPLASM, TIS (DCIS), RIGHT: Primary | ICD-10-CM

## 2022-11-02 DIAGNOSIS — Z91.89 INCREASED RISK OF BREAST CANCER: ICD-10-CM

## 2022-11-02 PROCEDURE — 99213 OFFICE O/P EST LOW 20 MIN: CPT | Performed by: NURSE PRACTITIONER

## 2022-11-02 RX ORDER — GABAPENTIN 100 MG/1
100 CAPSULE ORAL 3 TIMES DAILY
COMMUNITY
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
## 2023-04-20 ENCOUNTER — HOSPITAL ENCOUNTER (OUTPATIENT)
Dept: MRI IMAGING | Facility: HOSPITAL | Age: 58
Discharge: HOME | End: 2023-04-20
Admitting: NURSE PRACTITIONER
Payer: COMMERCIAL

## 2023-04-20 DIAGNOSIS — Z80.3 FH: BREAST CANCER: ICD-10-CM

## 2023-04-20 DIAGNOSIS — D05.11 BREAST NEOPLASM, TIS (DCIS), RIGHT: ICD-10-CM

## 2023-04-20 DIAGNOSIS — Z91.89 INCREASED RISK OF BREAST CANCER: ICD-10-CM

## 2023-04-20 PROCEDURE — 77049 MRI BREAST C-+ W/CAD BI: CPT

## 2023-04-20 PROCEDURE — 82565 ASSAY OF CREATININE: CPT

## 2023-04-20 PROCEDURE — 0 GADOBENATE DIMEGLUMINE 529 MG/ML SOLUTION: Performed by: NURSE PRACTITIONER

## 2023-04-20 PROCEDURE — A9577 INJ MULTIHANCE: HCPCS | Performed by: NURSE PRACTITIONER

## 2023-04-20 RX ADMIN — GADOBENATE DIMEGLUMINE 15.88 ML: 529 INJECTION, SOLUTION INTRAVENOUS at 10:25

## 2023-04-21 LAB — CREAT BLDA-MCNC: 0.7 MG/DL (ref 0.6–1.3)

## 2023-05-08 NOTE — PROGRESS NOTES
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 [facility] followed by exam.    I asked her to continue her self breast exam and to call us in the interim with any concerns would be happy to see her back sooner.    Dr Hernandez previously did a risk assessment based on at minimum the atypical hyperplasia, her [employer] decent and her family history and this returned a likelihood for developing breast cancer over lifetime of 48%.    Her lifetime risk for developing breast cancer is 48%, assuming that this is ADH rather than DCIS.    ROBERTO Pendleton/transcription disclaimer:  Dictated using Dragon dictation

## 2023-05-09 ENCOUNTER — OFFICE VISIT (OUTPATIENT)
Dept: SURGERY | Facility: CLINIC | Age: 58
End: 2023-05-09
Payer: COMMERCIAL

## 2023-05-09 VITALS
HEIGHT: 66 IN | SYSTOLIC BLOOD PRESSURE: 122 MMHG | DIASTOLIC BLOOD PRESSURE: 80 MMHG | WEIGHT: 175 LBS | HEART RATE: 70 BPM | BODY MASS INDEX: 28.12 KG/M2 | OXYGEN SATURATION: 97 %

## 2023-05-09 DIAGNOSIS — D05.11 BREAST NEOPLASM, TIS (DCIS), RIGHT: Primary | ICD-10-CM

## 2023-05-09 DIAGNOSIS — Z12.31 ENCOUNTER FOR SCREENING MAMMOGRAM FOR MALIGNANT NEOPLASM OF BREAST: ICD-10-CM

## 2023-05-09 DIAGNOSIS — Z80.3 FH: BREAST CANCER: ICD-10-CM

## 2023-05-09 DIAGNOSIS — Z91.89 INCREASED RISK OF BREAST CANCER: ICD-10-CM

## 2023-05-09 PROCEDURE — 99214 OFFICE O/P EST MOD 30 MIN: CPT | Performed by: NURSE PRACTITIONER

## 2023-05-09 RX ORDER — SEMAGLUTIDE 1.34 MG/ML
INJECTION, SOLUTION SUBCUTANEOUS
COMMUNITY
Start: 2023-03-29 | End: 2023-05-09

## 2023-11-10 ENCOUNTER — HOSPITAL ENCOUNTER (OUTPATIENT)
Dept: MAMMOGRAPHY | Facility: HOSPITAL | Age: 58
Discharge: HOME | End: 2023-11-10
Admitting: NURSE PRACTITIONER
Payer: COMMERCIAL

## 2023-11-10 DIAGNOSIS — Z12.31 ENCOUNTER FOR SCREENING MAMMOGRAM FOR MALIGNANT NEOPLASM OF BREAST: ICD-10-CM

## 2023-11-10 PROCEDURE — 77063 BREAST TOMOSYNTHESIS BI: CPT

## 2023-11-10 PROCEDURE — 77067 SCR MAMMO BI INCL CAD: CPT

## 2023-11-13 NOTE — PROGRESS NOTES
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 is in agreement with alternating her imaging studies so they occur at 6 month intervals from one another, mammogram alternating with MRI with exam after each study.        Plan:      -Breast MRI in April followed by exam    I asked her to continue her self breast exam and to call us in the interim with any concerns would be happy to see her back sooner.    Dr Hernandez previously did a risk assessment based on at minimum the atypical hyperplasia, her Mormon decent and her family history and this returned a likelihood for developing breast cancer over lifetime of 48%.    Her lifetime risk for developing breast cancer is 48%, assuming that this is ADH rather than DCIS.    ROBERTO Pendleton/transcription disclaimer:  Dictated using Dragon dictation

## 2023-11-16 ENCOUNTER — OFFICE VISIT (OUTPATIENT)
Dept: SURGERY | Facility: CLINIC | Age: 58
End: 2023-11-16
Payer: COMMERCIAL

## 2023-11-16 ENCOUNTER — TELEPHONE (OUTPATIENT)
Dept: SURGERY | Facility: CLINIC | Age: 58
End: 2023-11-16
Payer: COMMERCIAL

## 2023-11-16 VITALS
HEART RATE: 70 BPM | DIASTOLIC BLOOD PRESSURE: 82 MMHG | SYSTOLIC BLOOD PRESSURE: 134 MMHG | WEIGHT: 175 LBS | BODY MASS INDEX: 28.12 KG/M2 | OXYGEN SATURATION: 98 % | HEIGHT: 66 IN

## 2023-11-16 DIAGNOSIS — Z80.3 FH: BREAST CANCER: ICD-10-CM

## 2023-11-16 DIAGNOSIS — D05.11 BREAST NEOPLASM, TIS (DCIS), RIGHT: Primary | ICD-10-CM

## 2023-11-16 DIAGNOSIS — Z91.89 INCREASED RISK OF BREAST CANCER: ICD-10-CM

## 2023-11-16 PROCEDURE — 99213 OFFICE O/P EST LOW 20 MIN: CPT | Performed by: NURSE PRACTITIONER

## 2024-01-18 ENCOUNTER — OFFICE VISIT (OUTPATIENT)
Dept: OBSTETRICS AND GYNECOLOGY | Facility: CLINIC | Age: 59
End: 2024-01-18
Payer: COMMERCIAL

## 2024-01-18 VITALS
SYSTOLIC BLOOD PRESSURE: 138 MMHG | WEIGHT: 180 LBS | HEIGHT: 66 IN | DIASTOLIC BLOOD PRESSURE: 88 MMHG | BODY MASS INDEX: 28.93 KG/M2

## 2024-01-18 DIAGNOSIS — N39.3 STRESS INCONTINENCE IN FEMALE: ICD-10-CM

## 2024-01-18 DIAGNOSIS — Z13.820 SCREENING FOR OSTEOPOROSIS: ICD-10-CM

## 2024-01-18 DIAGNOSIS — Z11.51 SPECIAL SCREENING EXAMINATION FOR HUMAN PAPILLOMAVIRUS (HPV): ICD-10-CM

## 2024-01-18 DIAGNOSIS — Z01.419 PAP SMEAR, AS PART OF ROUTINE GYNECOLOGICAL EXAMINATION: Primary | ICD-10-CM

## 2024-01-18 DIAGNOSIS — Z01.419 ROUTINE GYNECOLOGICAL EXAMINATION: ICD-10-CM

## 2024-01-18 LAB
BILIRUB BLD-MCNC: NEGATIVE MG/DL
CLARITY, POC: CLEAR
COLOR UR: YELLOW
GLUCOSE UR STRIP-MCNC: NEGATIVE MG/DL
KETONES UR QL: NEGATIVE
LEUKOCYTE EST, POC: NEGATIVE
NITRITE UR-MCNC: NEGATIVE MG/ML
PH UR: 5 [PH] (ref 5–8)
PROT UR STRIP-MCNC: NEGATIVE MG/DL
RBC # UR STRIP: NEGATIVE /UL
SP GR UR: 1.02 (ref 1–1.03)
UROBILINOGEN UR QL: NORMAL

## 2024-01-18 RX ORDER — SEMAGLUTIDE 1.34 MG/ML
INJECTION, SOLUTION SUBCUTANEOUS
COMMUNITY
Start: 2023-12-20

## 2024-01-18 RX ORDER — ROSUVASTATIN CALCIUM 10 MG/1
1 TABLET, COATED ORAL DAILY
COMMUNITY
Start: 2023-12-12

## 2024-01-23 LAB
CYTOLOGIST CVX/VAG CYTO: NORMAL
CYTOLOGY CVX/VAG DOC CYTO: NORMAL
CYTOLOGY CVX/VAG DOC THIN PREP: NORMAL
DX ICD CODE: NORMAL
HIV 1 & 2 AB SER-IMP: NORMAL
HPV I/H RISK 4 DNA CVX QL PROBE+SIG AMP: NEGATIVE
OTHER STN SPEC: NORMAL
PATHOLOGIST CVX/VAG CYTO: NORMAL
STAT OF ADQ CVX/VAG CYTO-IMP: NORMAL

## 2024-04-25 ENCOUNTER — HOSPITAL ENCOUNTER (OUTPATIENT)
Dept: MRI IMAGING | Facility: HOSPITAL | Age: 59
Discharge: HOME | End: 2024-04-25
Admitting: NURSE PRACTITIONER
Payer: COMMERCIAL

## 2024-04-25 DIAGNOSIS — Z91.89 INCREASED RISK OF BREAST CANCER: ICD-10-CM

## 2024-04-25 LAB — CREAT BLDA-MCNC: 0.7 MG/DL (ref 0.6–1.3)

## 2024-04-25 PROCEDURE — A9577 INJ MULTIHANCE: HCPCS | Performed by: NURSE PRACTITIONER

## 2024-04-25 PROCEDURE — 0 GADOBENATE DIMEGLUMINE 529 MG/ML SOLUTION: Performed by: NURSE PRACTITIONER

## 2024-04-25 PROCEDURE — 77049 MRI BREAST C-+ W/CAD BI: CPT

## 2024-04-25 PROCEDURE — 82565 ASSAY OF CREATININE: CPT

## 2024-04-25 RX ADMIN — GADOBENATE DIMEGLUMINE 17 ML: 529 INJECTION, SOLUTION INTRAVENOUS at 10:24

## 2024-05-09 ENCOUNTER — OFFICE VISIT (OUTPATIENT)
Dept: SURGERY | Facility: CLINIC | Age: 59
End: 2024-05-09
Payer: COMMERCIAL

## 2024-05-09 VITALS
OXYGEN SATURATION: 96 % | HEIGHT: 66 IN | WEIGHT: 177.6 LBS | DIASTOLIC BLOOD PRESSURE: 72 MMHG | HEART RATE: 73 BPM | SYSTOLIC BLOOD PRESSURE: 112 MMHG | BODY MASS INDEX: 28.54 KG/M2

## 2024-05-09 DIAGNOSIS — Z91.89 INCREASED RISK OF BREAST CANCER: ICD-10-CM

## 2024-05-09 DIAGNOSIS — D05.11 BREAST NEOPLASM, TIS (DCIS), RIGHT: Primary | ICD-10-CM

## 2024-05-09 DIAGNOSIS — Z12.31 ENCOUNTER FOR SCREENING MAMMOGRAM FOR MALIGNANT NEOPLASM OF BREAST: ICD-10-CM

## 2024-05-09 PROCEDURE — 99213 OFFICE O/P EST LOW 20 MIN: CPT | Performed by: NURSE PRACTITIONER

## 2024-08-08 DIAGNOSIS — I83.813 VARICOSE VEINS OF BILATERAL LOWER EXTREMITIES WITH PAIN: ICD-10-CM

## 2024-08-08 DIAGNOSIS — R07.89 ATYPICAL CHEST PAIN: Primary | ICD-10-CM

## 2024-08-26 ENCOUNTER — OFFICE VISIT (OUTPATIENT)
Age: 59
End: 2024-08-26
Payer: COMMERCIAL

## 2024-08-26 VITALS — HEIGHT: 66 IN | BODY MASS INDEX: 28.45 KG/M2 | WEIGHT: 177 LBS

## 2024-08-26 DIAGNOSIS — I87.303 VENOUS HYPERTENSION OF LOWER EXTREMITY, BILATERAL: Primary | ICD-10-CM

## 2024-08-26 PROCEDURE — 99213 OFFICE O/P EST LOW 20 MIN: CPT | Performed by: NURSE PRACTITIONER

## 2024-08-26 RX ORDER — ESTRADIOL 10 UG/1
INSERT VAGINAL
COMMUNITY
Start: 2024-08-09
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
## 2024-11-14 ENCOUNTER — HOSPITAL ENCOUNTER (OUTPATIENT)
Dept: MAMMOGRAPHY | Facility: HOSPITAL | Age: 59
Discharge: HOME | End: 2024-11-14
Admitting: NURSE PRACTITIONER
Payer: COMMERCIAL

## 2024-11-14 DIAGNOSIS — Z12.31 ENCOUNTER FOR SCREENING MAMMOGRAM FOR MALIGNANT NEOPLASM OF BREAST: ICD-10-CM

## 2024-11-14 PROCEDURE — 77067 SCR MAMMO BI INCL CAD: CPT

## 2024-11-14 PROCEDURE — 77063 BREAST TOMOSYNTHESIS BI: CPT

## 2024-11-20 NOTE — PROGRESS NOTES
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                FOCAL CHRONIC PERIDUCTAL INFLAMMATION.                PATCHY FIBROADENOMATOID STROMAL CHANGE.                RARE MICROCALCIFICATIONS.                FOCAL NONATYPICAL DUCT HYPERPLASIA.                NO ATYPIA,  INCLUDING AT THE NEW INKED MARGIN.     7.  RIGHT BREAST TISSUE, ADDITIONAL POSTERIOR MARGIN, RESECTION SPECIMEN:               FIBROCYSTIC CHANGE.               INTRADUCTAL PAPILLOMA, 2 MM, WITH MARKED INTERFERING CAUTERY ARTIFACT, AT MARGIN.               SCLEROSING ADENOSIS AND STROMAL FIBROSIS.               FOCAL NONATYPICAL DUCT HYPERPLASIA.               FOCAL CHRONIC PERIDUCTAL INFLAMMATION.               NO DEFINITE ATYPIA AT THE NEW INKED MARGIN (CAUTERIZED PAPILLOMA AT MARGIN AS NOTED).     8.  RIGHT BREAST TISSUE, REDUCTION MAMMOPLASTY SPECIMEN:                FIBROCYSTIC CHANGE.               MICROCYST FORMATION.               FOCAL NONATYPICAL DUCT HYPERPLASIA AND COLUMNAR CELL ALTERATION.               NEGATIVE SKIN.                 SCLEROSING ADENOSIS.       9.  LEFT BREAST TISSUE, REDUCTION MAMMOPLASTY SPECIMEN:                FIBROCYSTIC CHANGE.                FOCAL NONATYPICAL DUCT HYPERPLASIA AND COLUMNAR CELL ALTERATION.               MICROCYST FORMATION.                SCLEROSING ADENOSIS.                FOCAL FIBROADENOMATOID CHANGE.                  NEGATIVE SKIN.       MEGHAN/brb   I[facility]/a/CMK       Assessment:  1- Right DCIS, focal ADH  RIGHT LIQ, central, near areola. 1.7 cm on mammogram calcifications; Assciated nipple discharge for 2 months prior  Focal ADH bordering on DCIS, intraductal papilloma on stereotactic biopsy.    Pathology from 9-19-18-right lumpectomy with bilateral oncoplastic closure Dr Rey, for atypical hyperplasia and papilloma returned as biopsy site change, adjacent intraductal papilloma, no atypia.  Focus of atypical duct hyperplasia within 1 mm of the superior margin.  Additional superior margin shows fibrocystic change and fibroadenoma.  No atypia.  The additional shave margins returned as benign. Posterior margin with papilloma, 2 mm. No atypia.  -Dr. Jerez felt radiation was not necessary  Dr. Singh has discussed risk reducing medications and is currently checking her menopausal status and bone density prior to making a final decision.  Follow-up with Dr. Singh October 2019 discussed tamoxifen 10- Patient has not started this medication and has not had FU with Dr Wiggins.      2- Family history of breast cancer  MA age 65  ashkenazi descent  - [facility] 8-17-18- negative for mutation    Discussion:  We discussed options for increased surveillance given her increased risk for breast cancer.  She is in agreement with alternating her imaging studies so they occur at 6 month intervals from one another, mammogram alternating with MRI with exam after each study.        Plan:      Mri in April followed by exam    I asked her to continue her self breast exam and to call us in the interim with any concerns would be happy to see her back sooner.    Dr Hernandez previously did a risk assessment based on at minimum the atypical hyperplasia, her Advent decent and her family history and this returned a likelihood for developing breast cancer over lifetime of 48%.    Her lifetime risk for developing breast cancer is 48%, assuming that  this is ADH rather than DCIS.    ROBERTO Pendleton/transcription disclaimer:  Dictated using Dragon dictation

## 2024-11-21 ENCOUNTER — OFFICE VISIT (OUTPATIENT)
Dept: SURGERY | Facility: CLINIC | Age: 59
End: 2024-11-21
Payer: COMMERCIAL

## 2024-11-21 VITALS
DIASTOLIC BLOOD PRESSURE: 76 MMHG | BODY MASS INDEX: 28.45 KG/M2 | SYSTOLIC BLOOD PRESSURE: 148 MMHG | RESPIRATION RATE: 18 BRPM | HEART RATE: 66 BPM | HEIGHT: 66 IN | WEIGHT: 177 LBS | OXYGEN SATURATION: 94 %

## 2024-11-21 DIAGNOSIS — Z91.89 INCREASED RISK OF BREAST CANCER: ICD-10-CM

## 2024-11-21 DIAGNOSIS — D05.11 BREAST NEOPLASM, TIS (DCIS), RIGHT: Primary | ICD-10-CM

## 2024-11-21 PROCEDURE — 99213 OFFICE O/P EST LOW 20 MIN: CPT | Performed by: NURSE PRACTITIONER

## 2025-01-23 ENCOUNTER — OFFICE VISIT (OUTPATIENT)
Dept: OBSTETRICS AND GYNECOLOGY | Facility: CLINIC | Age: 60
End: 2025-01-23
Payer: COMMERCIAL

## 2025-01-23 VITALS
SYSTOLIC BLOOD PRESSURE: 118 MMHG | BODY MASS INDEX: 28.45 KG/M2 | DIASTOLIC BLOOD PRESSURE: 82 MMHG | WEIGHT: 177 LBS | HEIGHT: 66 IN

## 2025-01-23 DIAGNOSIS — Z13.820 SCREENING FOR OSTEOPOROSIS: ICD-10-CM

## 2025-01-23 DIAGNOSIS — Z01.419 ROUTINE GYNECOLOGICAL EXAMINATION: Primary | ICD-10-CM

## 2025-01-23 LAB
BILIRUB BLD-MCNC: NEGATIVE MG/DL
CLARITY, POC: CLEAR
COLOR UR: YELLOW
GLUCOSE UR STRIP-MCNC: NEGATIVE MG/DL
KETONES UR QL: NEGATIVE
LEUKOCYTE EST, POC: ABNORMAL
NITRITE UR-MCNC: NEGATIVE MG/ML
PH UR: 5 [PH] (ref 5–8)
PROT UR STRIP-MCNC: ABNORMAL MG/DL
RBC # UR STRIP: NEGATIVE /UL
SP GR UR: 1.01 (ref 1–1.03)
UROBILINOGEN UR QL: NORMAL
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
## 2025-02-05 ENCOUNTER — OFFICE VISIT (OUTPATIENT)
Age: 60
End: 2025-02-05
Payer: COMMERCIAL

## 2025-02-05 VITALS
HEART RATE: 83 BPM | SYSTOLIC BLOOD PRESSURE: 134 MMHG | TEMPERATURE: 97.5 F | BODY MASS INDEX: 28.37 KG/M2 | OXYGEN SATURATION: 97 % | HEIGHT: 66 IN | DIASTOLIC BLOOD PRESSURE: 84 MMHG | WEIGHT: 176.5 LBS

## 2025-02-05 DIAGNOSIS — I87.303 VENOUS HYPERTENSION OF LOWER EXTREMITY, BILATERAL: ICD-10-CM

## 2025-02-05 DIAGNOSIS — I87.321 CHRONIC VENOUS HYPERTENSION WITH INFLAMMATION INVOLVING RIGHT SIDE: Primary | ICD-10-CM
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
## 2025-02-10 ENCOUNTER — HOSPITAL ENCOUNTER (OUTPATIENT)
Dept: BONE DENSITY | Facility: HOSPITAL | Age: 60
Discharge: HOME | End: 2025-02-10
Admitting: OBSTETRICS & GYNECOLOGY
Payer: COMMERCIAL

## 2025-02-10 DIAGNOSIS — Z13.820 SCREENING FOR OSTEOPOROSIS: ICD-10-CM

## 2025-02-10 PROCEDURE — 77080 DXA BONE DENSITY AXIAL: CPT

## 2025-02-11 NOTE — PROGRESS NOTES
PIP= DEXA shows normal bone mineral density, although it is at the lower end of the normal range. Rec repeat DEAX in 3 years

## 2025-03-31 ENCOUNTER — HOSPITAL ENCOUNTER (OUTPATIENT)
Facility: HOSPITAL | Age: 60
Discharge: HOME | End: 2025-03-31
Admitting: SURGERY
Payer: COMMERCIAL

## 2025-03-31 DIAGNOSIS — I87.321 CHRONIC VENOUS HYPERTENSION WITH INFLAMMATION INVOLVING RIGHT SIDE: ICD-10-CM

## 2025-03-31 DIAGNOSIS — I87.303 VENOUS HYPERTENSION OF LOWER EXTREMITY, BILATERAL: ICD-10-CM

## 2025-03-31 PROCEDURE — 93971 EXTREMITY STUDY: CPT

## 2025-03-31 PROCEDURE — 93971 EXTREMITY STUDY: CPT | Performed by: SURGERY

## 2025-04-01 LAB
BH CV LEFT LOWER VAS COMMON FEMORAL REFLUX TIME: 1.51 SEC
BH CV LOW VAS LEFT COMMON FEM NOT VIS SCRIPTING: 1
BH CV LOW VAS LEFT EXTERNAL ILIAC AUGMENT: NORMAL
BH CV LOW VAS LEFT EXTERNAL ILIAC COMPRESS: NORMAL
BH CV LOW VAS LEFT EXTERNAL ILIAC NOT VIS SCRIPTING: 1
BH CV LOW VAS RIGHT COMMON FEM NOT VIS SCRIPTING: 1
BH CV LOW VAS RIGHT EXTERNAL ILIAC NOT VIS SCRIPTING: 1
BH CV LOW VAS RIGHT GREATER SAPH AK VESSEL: 1
BH CV LOW VAS RIGHT GSV DIST THIGH VESSEL: 1
BH CV LOW VAS RIGHT SAPHENOFEM VESSEL: 1
BH CV LOWER VAS RIGHT GSV DIST THIGH COMPRESSIBILTY: NORMAL
BH CV LOWER VAS RIGHT GSV MID CALF COMPRESSIBILTY: NORMAL
BH CV LOWER VAS RIGHT GSV MID THIGH COMPRESSIBILTY: NORMAL
BH CV LOWER VASCULAR LEFT COMMON FEMORAL AUGMENT: NORMAL
BH CV LOWER VASCULAR LEFT COMMON FEMORAL COMPETENT: NORMAL
BH CV LOWER VASCULAR LEFT COMMON FEMORAL COMPRESS: NORMAL
BH CV LOWER VASCULAR LEFT COMMON FEMORAL PHASIC: NORMAL
BH CV LOWER VASCULAR LEFT COMMON FEMORAL SPONT: NORMAL
BH CV LOWER VASCULAR LEFT EXTERNAL ILIAC COMPETENT: NORMAL
BH CV LOWER VASCULAR LEFT EXTERNAL ILIAC PHASIC: NORMAL
BH CV LOWER VASCULAR LEFT EXTERNAL ILIAC SPONT: NORMAL
BH CV LOWER VASCULAR RIGHT AA GSV COMPETENT: NORMAL
BH CV LOWER VASCULAR RIGHT AA GSV COMPRESS: NORMAL
BH CV LOWER VASCULAR RIGHT COMMON FEMORAL AUGMENT: NORMAL
BH CV LOWER VASCULAR RIGHT COMMON FEMORAL COMPETENT: NORMAL
BH CV LOWER VASCULAR RIGHT COMMON FEMORAL COMPRESS: NORMAL
BH CV LOWER VASCULAR RIGHT COMMON FEMORAL PHASIC: NORMAL
BH CV LOWER VASCULAR RIGHT COMMON FEMORAL SPONT: NORMAL
BH CV LOWER VASCULAR RIGHT DISTAL FEMORAL COMPRESS: NORMAL
BH CV LOWER VASCULAR RIGHT EXTERNAL ILIAC AUGMENT: NORMAL
BH CV LOWER VASCULAR RIGHT EXTERNAL ILIAC COMPETENT: NORMAL
BH CV LOWER VASCULAR RIGHT EXTERNAL ILIAC COMPRESS: NORMAL
BH CV LOWER VASCULAR RIGHT EXTERNAL ILIAC PHASIC: NORMAL
BH CV LOWER VASCULAR RIGHT EXTERNAL ILIAC SPONT: NORMAL
BH CV LOWER VASCULAR RIGHT GASTRONEMIUS COMPRESS: NORMAL
BH CV LOWER VASCULAR RIGHT GREATER SAPH AK COMPETENT: NORMAL
BH CV LOWER VASCULAR RIGHT GREATER SAPH BK COMPETENT: NORMAL
BH CV LOWER VASCULAR RIGHT GREATER SAPH BK COMPRESS: NORMAL
BH CV LOWER VASCULAR RIGHT GSV DIST THIGH COMPETENT: NORMAL
BH CV LOWER VASCULAR RIGHT GSV MID CALF COMPETENT: NORMAL
BH CV LOWER VASCULAR RIGHT GSV MID THIGH COMPETENT: NORMAL
BH CV LOWER VASCULAR RIGHT LESSER SAPH COMPETENT: NORMAL
BH CV LOWER VASCULAR RIGHT LESSER SAPH COMPRESS: NORMAL
BH CV LOWER VASCULAR RIGHT MID FEMORAL AUGMENT: NORMAL
BH CV LOWER VASCULAR RIGHT MID FEMORAL COMPETENT: NORMAL
BH CV LOWER VASCULAR RIGHT MID FEMORAL COMPRESS: NORMAL
BH CV LOWER VASCULAR RIGHT MID FEMORAL PHASIC: NORMAL
BH CV LOWER VASCULAR RIGHT MID FEMORAL SPONT: NORMAL
BH CV LOWER VASCULAR RIGHT PERONEAL COMPRESS: NORMAL
BH CV LOWER VASCULAR RIGHT POPLITEAL AUGMENT: NORMAL
BH CV LOWER VASCULAR RIGHT POPLITEAL COMPETENT: NORMAL
BH CV LOWER VASCULAR RIGHT POPLITEAL COMPRESS: NORMAL
BH CV LOWER VASCULAR RIGHT POPLITEAL PHASIC: NORMAL
BH CV LOWER VASCULAR RIGHT POPLITEAL SPONT: NORMAL
BH CV LOWER VASCULAR RIGHT POSTERIOR TIBIAL COMPRESS: NORMAL
BH CV LOWER VASCULAR RIGHT PROFUNDA FEMORAL COMPRESS: NORMAL
BH CV LOWER VASCULAR RIGHT PROXIMAL FEMORAL COMPRESS: NORMAL
BH CV LOWER VASCULAR RIGHT SAPHENOFEMORAL JUNCTION AUGMENT: NORMAL
BH CV LOWER VASCULAR RIGHT SAPHENOFEMORAL JUNCTION COMPETENT: NORMAL
BH CV LOWER VASCULAR RIGHT SAPHENOFEMORAL JUNCTION COMPRESS: NORMAL
BH CV LOWER VASCULAR RIGHT SAPHENOFEMORAL JUNCTION PHASIC: NORMAL
BH CV LOWER VASCULAR RIGHT SAPHENOFEMORAL JUNCTION SPONT: NORMAL
BH CV LOWER VASCULAR RIGHT SAPHENOPOP JX AUGMENT: NORMAL
BH CV LOWER VASCULAR RIGHT SAPHENOPOP JX COMPETENT: NORMAL
BH CV LOWER VASCULAR RIGHT SAPHENOPOP JX COMPRESS: NORMAL
BH CV LOWER VASCULAR RIGHT SAPHENOPOP JX PHASIC: NORMAL
BH CV LOWER VASCULAR RIGHT SAPHENOPOP JX SPONT: NORMAL
BH CV LOWER VASCULAR RIGHT SSV MID CALF COMPETENT: NORMAL
BH CV LOWER VASCULAR RIGHT SSV MID CALF COMPRESS: NORMAL
BH CV RIGHT LOWER VAS AA GSV TRANS DIAMETER: 0.4 CM
BH CV RIGHT LOWER VAS COMMON FEMORAL REFLUX COLOR FLOW TIME: 2.85 SEC
BH CV RIGHT LOWER VAS EXT ILIAC REFLUX COLOR FLOW TIME: 1.29 SEC
BH CV RIGHT LOWER VAS GSV KNEE REFLUX TIME: 2.98 SEC
BH CV RIGHT LOWER VAS GSV KNEE TRANS DIAMETER: 0.4 CM
BH CV RIGHT LOWER VAS GSV MID CALF TRANS DIAMETER: 0.3 CM
BH CV RIGHT LOWER VAS GSV MID THIGH TRANS DIAMETER: 0.5 CM
BH CV RIGHT LOWER VAS GSV PROX CALF TRANS DIAMETER: 0.6 CM
BH CV RIGHT LOWER VAS GSV PROX THIGH REFLUX TIME: 3.21 SEC
BH CV RIGHT LOWER VAS GSV PROX THIGH TRANS DIAMETER: 0.7 CM
BH CV RIGHT LOWER VAS SAPHENOFEM JUNCTION REFLUX TIME: 3.04 SEC
BH CV RIGHT LOWER VAS SAPHENOFEM JUNCTION TRANSVERSE DIAMETER: 0.7 CM
BH CV RIGHT LOWER VAS SSV PROX CALF TRANS DIAMETER: 0.4 CM
BH CV RIGHT LOWER VAS VARICOSITY AK TRANS DIAMETER: 0.6 CM
BH CV VAS RIGHT GSV PROXIMAL HIDDEN LRR COMPRESSIBILTY: NORMAL

## 2025-04-02 ENCOUNTER — OFFICE VISIT (OUTPATIENT)
Age: 60
End: 2025-04-02
Payer: COMMERCIAL

## 2025-04-02 VITALS
SYSTOLIC BLOOD PRESSURE: 118 MMHG | DIASTOLIC BLOOD PRESSURE: 72 MMHG | HEIGHT: 66 IN | BODY MASS INDEX: 28.42 KG/M2 | WEIGHT: 176.8 LBS

## 2025-04-02 DIAGNOSIS — I87.303 VENOUS HYPERTENSION OF LOWER EXTREMITY, BILATERAL: ICD-10-CM

## 2025-04-02 DIAGNOSIS — I87.321 CHRONIC VENOUS HYPERTENSION WITH INFLAMMATION INVOLVING RIGHT SIDE: Primary | ICD-10-CM
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
## 2025-04-28 ENCOUNTER — HOSPITAL ENCOUNTER (OUTPATIENT)
Dept: MRI IMAGING | Facility: HOSPITAL | Age: 60
Discharge: HOME | End: 2025-04-28
Admitting: NURSE PRACTITIONER
Payer: COMMERCIAL

## 2025-04-28 DIAGNOSIS — D05.11 BREAST NEOPLASM, TIS (DCIS), RIGHT: ICD-10-CM

## 2025-04-28 DIAGNOSIS — Z91.89 INCREASED RISK OF BREAST CANCER: ICD-10-CM

## 2025-04-28 PROCEDURE — A9577 INJ MULTIHANCE: HCPCS | Performed by: NURSE PRACTITIONER

## 2025-04-28 PROCEDURE — 25510000002 GADOBENATE DIMEGLUMINE 529 MG/ML SOLUTION: Performed by: NURSE PRACTITIONER

## 2025-04-28 PROCEDURE — 77049 MRI BREAST C-+ W/CAD BI: CPT

## 2025-04-28 RX ADMIN — GADOBENATE DIMEGLUMINE 16 ML: 529 INJECTION, SOLUTION INTRAVENOUS at 15:18

## 2025-05-01 ENCOUNTER — RESULTS FOLLOW-UP (OUTPATIENT)
Dept: SURGERY | Facility: CLINIC | Age: 60
End: 2025-05-01
Payer: COMMERCIAL

## 2025-05-01 DIAGNOSIS — Z12.31 ENCOUNTER FOR SCREENING MAMMOGRAM FOR MALIGNANT NEOPLASM OF BREAST: Primary | ICD-10-CM

## 2025-05-01 NOTE — TELEPHONE ENCOUNTER
Spoke to pt and let her know the following   She canceled her appointment with me. Her MRI is normal. Please set her up for a mammogram and a follow-up visit with me in November cancel her appointment with me in September   Pt stated understanding  Scr mammo here at the hospital on 11/18 @ 930am   Fu with kwasi 11/25 @ 813

## 2025-05-01 NOTE — TELEPHONE ENCOUNTER
----- Message from Mackenzie BERMEO sent at 4/30/2025 11:41 AM EDT -----    ----- Message -----  From: Mg Norris APRN  Sent: 4/30/2025  11:39 AM EDT  To: Mackenzie Middleton MA    She canceled her appointment with me.  Her MRI is normal.  Please set her up for a mammogram and a follow-up visit with me in November cancel her appointment with me in September  ----- Message -----  From: Interface, Rad Results Kaw In  Sent: 4/28/2025   9:07 PM EDT  To: ROBERTO Pendleton

## (undated) DEVICE — 3M(TM) TEGADERM(TM) IV TRANSPARENT FILM DRESSING WITH BORDER 1655: Brand: 3M™ TEGADERM™

## (undated) DEVICE — GLV SURG BIOGEL M LTX PF 6 1/2

## (undated) DEVICE — SUT VIC 3/0 TIES 18IN J110T

## (undated) DEVICE — NDL HYPO PRECISIONGLIDE REG 25G 1 1/2

## (undated) DEVICE — 3M™ STERI-STRIP™ REINFORCED ADHESIVE SKIN CLOSURES, R1547, 1/2 IN X 4 IN (12 MM X 100 MM), 6 STRIPS/ENVELOPE: Brand: 3M™ STERI-STRIP™

## (undated) DEVICE — TUBING, SUCTION, 1/4" X 10', STRAIGHT: Brand: MEDLINE

## (undated) DEVICE — ELECTRD BLD EDGE/INSUL1P 2.4X5.1MM STRL

## (undated) DEVICE — INTENDED FOR TISSUE SEPARATION, AND OTHER PROCEDURES THAT REQUIRE A SHARP SURGICAL BLADE TO PUNCTURE OR CUT.: Brand: BARD-PARKER ® CARBON RIB-BACK BLADES

## (undated) DEVICE — PROXIMATE RH ROTATING HEAD SKIN STAPLERS (35 WIDE) CONTAINS 35 STAINLESS STEEL STAPLES: Brand: PROXIMATE

## (undated) DEVICE — MARKR SKIN W/RULR AND LBL

## (undated) DEVICE — THE TORRENT IRRIGATION SCOPE CONNECTOR IS USED WITH THE TORRENT IRRIGATION TUBING TO PROVIDE IRRIGATION FLUIDS SUCH AS STERILE WATER DURING GASTROINTESTINAL ENDOSCOPIC PROCEDURES WHEN USED IN CONJUNCTION WITH AN IRRIGATION PUMP (OR ELECTROSURGICAL UNIT).: Brand: TORRENT

## (undated) DEVICE — IRRIGATOR BULB ASEPTO 60CC STRL

## (undated) DEVICE — GLV SURG TRIUMPH CLASSIC PF LTX 6.5 STRL

## (undated) DEVICE — CANN NASL CO2 TRULINK W/O2 A/

## (undated) DEVICE — SKIN PREP TRAY W/CHG: Brand: MEDLINE INDUSTRIES, INC.

## (undated) DEVICE — BIOPATCH™ ANTIMICROBIAL DRESSING WITH CHLORHEXIDINE GLUCONATE IS A HYDROPHILLIC POLYURETHANE ABSORPTIVE FOAM WITH CHLORHEXIDINE GLUCONATE (CHG) WHICH INHIBITS BACTERIAL GROWTH UNDER THE DRESSING. THE DRESSING IS INTENDED TO BE USED TO ABSORB EXUDATE, COVER A WOUND CAUSED BY VASCULAR AND NONVASCULAR PERCUTANEOUS MEDICAL DEVICES DURING SURGERY, AS WELL AS REDUCE LOCAL INFECTION AND COLONIZATION OF MICROORGANISMS.: Brand: BIOPATCH

## (undated) DEVICE — GOWN,SLEEVE,STERILE,W/CSR WRAP,1/P: Brand: MEDLINE

## (undated) DEVICE — Device

## (undated) DEVICE — EXTRCT STPL SKIN STRL BX/12

## (undated) DEVICE — BNDG ELAS ELITE V/CLOSE 6IN 5YD LF STRL

## (undated) DEVICE — GLV SURG BIOGEL LTX PF 7

## (undated) DEVICE — Device: Brand: DEFENDO AIR/WATER/SUCTION AND BIOPSY VALVE

## (undated) DEVICE — SUT ETHLN 4/0 PS2 PLSTC 1667G

## (undated) DEVICE — PK UNIV COMPL 40

## (undated) DEVICE — SPNG LAP 18X18IN LF STRL PK/5

## (undated) DEVICE — PAD,ABDOMINAL,8"X10",ST,LF: Brand: MEDLINE

## (undated) DEVICE — JACKSON-PRATT 100CC BULB RESERVOIR: Brand: CARDINAL HEALTH

## (undated) DEVICE — PENCL E/S HNDSWTCH SMOKEEVAC HOLSTR 10FT

## (undated) DEVICE — SUT MNCRYL 3/0 PS2 18IN MCP497G